# Patient Record
Sex: MALE | Race: WHITE | Employment: UNEMPLOYED | URBAN - METROPOLITAN AREA
[De-identification: names, ages, dates, MRNs, and addresses within clinical notes are randomized per-mention and may not be internally consistent; named-entity substitution may affect disease eponyms.]

---

## 2023-11-27 ENCOUNTER — APPOINTMENT (OUTPATIENT)
Dept: GENERAL RADIOLOGY | Age: 58
End: 2023-11-27
Payer: MEDICAID

## 2023-11-27 ENCOUNTER — HOSPITAL ENCOUNTER (EMERGENCY)
Age: 58
Discharge: HOME | End: 2023-11-27
Attending: EMERGENCY MEDICINE
Payer: MEDICAID

## 2023-11-27 VITALS
TEMPERATURE: 97.7 F | HEART RATE: 81 BPM | HEIGHT: 74 IN | WEIGHT: 237.8 LBS | SYSTOLIC BLOOD PRESSURE: 156 MMHG | DIASTOLIC BLOOD PRESSURE: 86 MMHG | BODY MASS INDEX: 30.52 KG/M2 | RESPIRATION RATE: 10 BRPM | OXYGEN SATURATION: 99 %

## 2023-11-27 DIAGNOSIS — S63.502A SPRAIN OF LEFT WRIST, INITIAL ENCOUNTER: Primary | ICD-10-CM

## 2023-11-27 PROCEDURE — 73110 X-RAY EXAM OF WRIST: CPT

## 2023-11-27 PROCEDURE — 99283 EMERGENCY DEPT VISIT LOW MDM: CPT

## 2023-11-27 RX ORDER — TRAMADOL HYDROCHLORIDE 50 MG/1
50 TABLET ORAL EVERY 6 HOURS PRN
Qty: 12 TABLET | Refills: 0 | Status: SHIPPED | OUTPATIENT
Start: 2023-11-27 | End: 2023-11-30

## 2023-11-27 ASSESSMENT — PAIN DESCRIPTION - LOCATION: LOCATION: WRIST

## 2023-11-27 ASSESSMENT — PAIN - FUNCTIONAL ASSESSMENT: PAIN_FUNCTIONAL_ASSESSMENT: 0-10

## 2023-11-27 ASSESSMENT — PAIN SCALES - GENERAL: PAINLEVEL_OUTOF10: 9

## 2023-11-27 ASSESSMENT — PAIN DESCRIPTION - DESCRIPTORS: DESCRIPTORS: THROBBING

## 2023-11-27 ASSESSMENT — PAIN DESCRIPTION - ORIENTATION: ORIENTATION: LEFT

## 2024-01-08 ENCOUNTER — HOSPITAL ENCOUNTER (EMERGENCY)
Age: 59
Discharge: HOME | End: 2024-01-08
Attending: EMERGENCY MEDICINE
Payer: MEDICAID

## 2024-01-08 ENCOUNTER — APPOINTMENT (OUTPATIENT)
Dept: GENERAL RADIOLOGY | Age: 59
End: 2024-01-08
Payer: MEDICAID

## 2024-01-08 VITALS
OXYGEN SATURATION: 100 % | HEIGHT: 73 IN | BODY MASS INDEX: 30.5 KG/M2 | DIASTOLIC BLOOD PRESSURE: 96 MMHG | HEART RATE: 74 BPM | RESPIRATION RATE: 17 BRPM | SYSTOLIC BLOOD PRESSURE: 139 MMHG | TEMPERATURE: 98.3 F | WEIGHT: 230.1 LBS

## 2024-01-08 DIAGNOSIS — J20.9 ACUTE BRONCHITIS, UNSPECIFIED ORGANISM: ICD-10-CM

## 2024-01-08 DIAGNOSIS — J10.1 INFLUENZA A: Primary | ICD-10-CM

## 2024-01-08 LAB
FLUAV RNA UPPER RESP QL NAA+PROBE: POSITIVE
FLUBV AG NPH QL: NEGATIVE
SARS-COV-2 RDRP RESP QL NAA+PROBE: NOT DETECTED

## 2024-01-08 PROCEDURE — 94664 DEMO&/EVAL PT USE INHALER: CPT

## 2024-01-08 PROCEDURE — 6370000000 HC RX 637 (ALT 250 FOR IP): Performed by: EMERGENCY MEDICINE

## 2024-01-08 PROCEDURE — 94760 N-INVAS EAR/PLS OXIMETRY 1: CPT

## 2024-01-08 PROCEDURE — 99284 EMERGENCY DEPT VISIT MOD MDM: CPT

## 2024-01-08 PROCEDURE — 94640 AIRWAY INHALATION TREATMENT: CPT

## 2024-01-08 PROCEDURE — 87804 INFLUENZA ASSAY W/OPTIC: CPT

## 2024-01-08 PROCEDURE — 87635 SARS-COV-2 COVID-19 AMP PRB: CPT

## 2024-01-08 PROCEDURE — 71045 X-RAY EXAM CHEST 1 VIEW: CPT

## 2024-01-08 RX ORDER — IPRATROPIUM BROMIDE AND ALBUTEROL SULFATE 2.5; .5 MG/3ML; MG/3ML
1 SOLUTION RESPIRATORY (INHALATION) ONCE
Status: COMPLETED | OUTPATIENT
Start: 2024-01-08 | End: 2024-01-08

## 2024-01-08 RX ORDER — ALBUTEROL SULFATE 90 UG/1
2 AEROSOL, METERED RESPIRATORY (INHALATION) EVERY 6 HOURS PRN
Qty: 18 G | Refills: 3 | Status: SHIPPED | OUTPATIENT
Start: 2024-01-08

## 2024-01-08 RX ORDER — PREDNISONE 10 MG/1
50 TABLET ORAL DAILY
Qty: 25 TABLET | Refills: 0 | Status: SHIPPED | OUTPATIENT
Start: 2024-01-08 | End: 2024-01-13

## 2024-01-08 RX ORDER — DEXTROMETHORPHAN HYDROBROMIDE AND PROMETHAZINE HYDROCHLORIDE 15; 6.25 MG/5ML; MG/5ML
5 SYRUP ORAL 4 TIMES DAILY PRN
Qty: 100 ML | Refills: 0 | Status: SHIPPED | OUTPATIENT
Start: 2024-01-08 | End: 2024-01-15

## 2024-01-08 RX ORDER — OSELTAMIVIR PHOSPHATE 75 MG/1
75 CAPSULE ORAL 2 TIMES DAILY
Qty: 10 CAPSULE | Refills: 0 | Status: SHIPPED | OUTPATIENT
Start: 2024-01-08 | End: 2024-01-13

## 2024-01-08 RX ADMIN — IPRATROPIUM BROMIDE AND ALBUTEROL SULFATE 1 DOSE: 2.5; .5 SOLUTION RESPIRATORY (INHALATION) at 15:35

## 2024-01-08 RX ADMIN — PREDNISONE 50 MG: 20 TABLET ORAL at 15:58

## 2024-01-08 ASSESSMENT — PAIN - FUNCTIONAL ASSESSMENT: PAIN_FUNCTIONAL_ASSESSMENT: NONE - DENIES PAIN

## 2024-01-08 NOTE — ED PROVIDER NOTES
[facility] Emergency Department - [facility]    I, Marvel Pruett MD, am the primary clinician of record.    CHIEF COMPLAINT  Chief Complaint   Patient presents with    Shortness of Breath     Pt states he had rt middle lobectomy approx 7-9 years ago, gets SOB with illness. Has tried OTC medications without relief.         HISTORY OF PRESENT ILLNESS  Juan Cantor is a 58 y.o. male  who presents to the ED complaining of cough and cold sx since Thursday.  Symptoms have been progressive since onset.  No measured fevers definitively.  He is a short of breath.  This is worse with coughing paroxysms.  No chest pains and no abd pain or vomiting/diarrhea.  Here with significant other who has similar sx.    He has a history of remote lung CA with RML resection that is in remission and did not require chemo or radiation.  He no longer smokes.  Has asthma but not COPD.    No other complaints, modifying factors or associated symptoms.     I have reviewed the following from the nursing documentation.    History reviewed. No pertinent past medical history.  History reviewed. No pertinent surgical history.  History reviewed. No pertinent family history.  Social History     Socioeconomic History    Marital status: Single     Spouse name: Not on file    Number of children: Not on file    Years of education: Not on file    Highest education level: Not on file   Occupational History    Not on file   Tobacco Use    Smoking status: Unknown    Smokeless tobacco: Not on file   Vaping Use    Vaping Use: Never used   Substance and Sexual Activity    Alcohol use: Not on file    Drug use: Not on file    Sexual activity: Not on file   Other Topics Concern    Not on file   Social History Narrative    Not on file     Social Determinants of Health     Financial Resource Strain: Not on file   Food Insecurity: Not on file   Transportation Needs: Not on file   Physical Activity: Not on file   Stress: Not on file   Social

## 2024-01-31 ENCOUNTER — HOSPITAL ENCOUNTER (EMERGENCY)
Age: 59
Discharge: HOME | End: 2024-01-31
Attending: EMERGENCY MEDICINE
Payer: MEDICAID

## 2024-01-31 VITALS
TEMPERATURE: 97.7 F | WEIGHT: 223 LBS | DIASTOLIC BLOOD PRESSURE: 81 MMHG | HEART RATE: 83 BPM | OXYGEN SATURATION: 100 % | SYSTOLIC BLOOD PRESSURE: 135 MMHG | RESPIRATION RATE: 16 BRPM | BODY MASS INDEX: 29.42 KG/M2

## 2024-01-31 DIAGNOSIS — H10.33 ACUTE CONJUNCTIVITIS OF BOTH EYES, UNSPECIFIED ACUTE CONJUNCTIVITIS TYPE: Primary | ICD-10-CM

## 2024-01-31 PROCEDURE — 99283 EMERGENCY DEPT VISIT LOW MDM: CPT

## 2024-01-31 RX ORDER — POLYETHYLENE GLYCOL 400 AND PROPYLENE GLYCOL 4; 3 MG/ML; MG/ML
2 SOLUTION/ DROPS OPHTHALMIC 3 TIMES DAILY
Qty: 10 ML | Refills: 0 | Status: SHIPPED | OUTPATIENT
Start: 2024-01-31 | End: 2024-02-05

## 2024-01-31 RX ORDER — TETRACAINE HYDROCHLORIDE 5 MG/ML
1 SOLUTION OPHTHALMIC ONCE
Status: DISCONTINUED | OUTPATIENT
Start: 2024-01-31 | End: 2024-01-31 | Stop reason: HOSPADM

## 2024-01-31 ASSESSMENT — VISUAL ACUITY
OU: 20/30
OS: 20/30
OD: 20/40

## 2024-01-31 ASSESSMENT — PAIN DESCRIPTION - PAIN TYPE: TYPE: ACUTE PAIN

## 2024-01-31 ASSESSMENT — PAIN DESCRIPTION - LOCATION: LOCATION: EYE

## 2024-01-31 ASSESSMENT — PAIN SCALES - GENERAL: PAINLEVEL_OUTOF10: 6

## 2024-01-31 ASSESSMENT — PAIN DESCRIPTION - FREQUENCY: FREQUENCY: CONTINUOUS

## 2024-01-31 ASSESSMENT — PAIN - FUNCTIONAL ASSESSMENT
PAIN_FUNCTIONAL_ASSESSMENT: NONE - DENIES PAIN
PAIN_FUNCTIONAL_ASSESSMENT: 0-10

## 2024-01-31 NOTE — DISCHARGE INSTR - COC
{Prognosis:5794793566}    Condition at Discharge: { Patient Condition:884672185}    Rehab Potential (if transferring to Rehab): {Prognosis:0159554313}    Recommended Labs or Other Treatments After Discharge: ***    Physician Certification: I certify the above information and transfer of Juan Cantor  is necessary for the continuing treatment of the diagnosis listed and that he requires {Admit to Appropriate Level of Care:23531} for {GREATER/LESS:390023792} 30 days.     Update Admission H&P: {CHP DME Changes in HandP:168529997}    PHYSICIAN SIGNATURE:  {Esignature:[id number]}

## 2024-01-31 NOTE — ED PROVIDER NOTES
ophthalmic solution 1 drop  1 drop Both Eyes Once Robert Rm MD         Current Outpatient Medications   Medication Sig Dispense Refill    polyethyl glycol-propyl glycol 0.4-0.3 % (SYSTANE) 0.4-0.3 % ophthalmic solution Place 2 drops into both eyes in the morning, at noon, and at bedtime for 5 days 10 mL 0    albuterol (PROVENTIL) (5 MG/ML) 0.5% nebulizer solution Take 1 mL by nebulization 4 times daily as needed for Wheezing 120 each 3    albuterol sulfate HFA (PROAIR HFA) 108 (90 Base) MCG/ACT inhaler Inhale 2 puffs into the lungs every 6 hours as needed for Wheezing 18 g 3     Allergies   Allergen Reactions    Ibuprofen      Kidney damage          PHYSICAL EXAM  ED Triage Vitals [01/31/24 0652]   BP Temp Temp Source Pulse Respirations SpO2 Height Weight - Scale   135/81 97.7 °F (36.5 °C) Oral 83 16 100 % -- 101.2 kg (223 lb)     General appearance: Awake and alert. Cooperative. No acute distress.  HEENT: Normocephalic. Atraumatic. Mucous membranes are moist. Bilateral conjunctival injection. No hyphema, hypopyon, or proptosis. No fluorescein uptake. No foreign bodies noted, included with bilateral lid eversion.   Skin: Warm and dry. No acute rashes.       ED COURSE/MDM    58 y.o. male presents with bilateral eye irritation. Likely a non-infectious conjunctivitis secondary to exposure to construction debris. No corneal abrasion or FB noted on exam. Patient has already performed copious eye irrigation. Visual acuities are reassuring. Will prescribe Systane eye drops. F/u with [facility] as needed. Usual strict return precautions for new or worsening symptoms communicated.      - History obtained from: patient     I, Robert Rm MD, am the primary clinician of record.     During the patient's ED course, the patient was given:  Medications   fluorescein ophthalmic strip 1 mg (has no administration in time range)   tetracaine (TETRAVISC) 0.5 % ophthalmic solution 1 drop (has no administration in time range)

## 2024-02-15 ENCOUNTER — APPOINTMENT (OUTPATIENT)
Dept: CT IMAGING | Age: 59
DRG: 030 | End: 2024-02-15
Payer: MEDICAID

## 2024-02-15 ENCOUNTER — HOSPITAL ENCOUNTER (INPATIENT)
Age: 59
LOS: 7 days | Discharge: HOME | DRG: 030 | End: 2024-02-22
Attending: EMERGENCY MEDICINE | Admitting: STUDENT IN AN ORGANIZED HEALTH CARE EDUCATION/TRAINING PROGRAM
Payer: MEDICAID

## 2024-02-15 ENCOUNTER — APPOINTMENT (OUTPATIENT)
Dept: MRI IMAGING | Age: 59
DRG: 030 | End: 2024-02-15
Payer: MEDICAID

## 2024-02-15 DIAGNOSIS — I65.23 CAROTID STENOSIS, BILATERAL: ICD-10-CM

## 2024-02-15 DIAGNOSIS — G45.9 TIA (TRANSIENT ISCHEMIC ATTACK): Primary | ICD-10-CM

## 2024-02-15 PROBLEM — R47.01 APHASIA: Status: ACTIVE | Noted: 2024-02-15

## 2024-02-15 PROBLEM — I65.21 RIGHT CAROTID ARTERY OCCLUSION: Status: ACTIVE | Noted: 2024-02-15

## 2024-02-15 LAB
ANION GAP SERPL CALCULATED.3IONS-SCNC: 9 MMOL/L (ref 3–16)
ANTI-XA UNFRAC HEPARIN: 0.12 IU/ML (ref 0.3–0.7)
ANTI-XA UNFRAC HEPARIN: 0.12 IU/ML (ref 0.3–0.7)
ANTI-XA UNFRAC HEPARIN: <0.1 IU/ML (ref 0.3–0.7)
APTT BLD: 29.6 SEC (ref 22.7–35.9)
BASOPHILS # BLD: 0.1 K/UL (ref 0–0.2)
BASOPHILS NFR BLD: 1.2 %
BUN SERPL-MCNC: 23 MG/DL (ref 7–20)
CALCIUM SERPL-MCNC: 9 MG/DL (ref 8.3–10.6)
CHLORIDE SERPL-SCNC: 105 MMOL/L (ref 99–110)
CHOLEST SERPL-MCNC: 258 MG/DL (ref 0–199)
CO2 SERPL-SCNC: 23 MMOL/L (ref 21–32)
CREAT SERPL-MCNC: 1.4 MG/DL (ref 0.9–1.3)
DEPRECATED RDW RBC AUTO: 14.9 % (ref 12.4–15.4)
EOSINOPHIL # BLD: 0.1 K/UL (ref 0–0.6)
EOSINOPHIL NFR BLD: 1.2 %
GFR SERPLBLD CREATININE-BSD FMLA CKD-EPI: 58 ML/MIN/{1.73_M2}
GLUCOSE BLD-MCNC: 122 MG/DL (ref 70–99)
GLUCOSE SERPL-MCNC: 115 MG/DL (ref 70–99)
HCT VFR BLD AUTO: 42.9 % (ref 40.5–52.5)
HDLC SERPL-MCNC: 34 MG/DL (ref 40–60)
HGB BLD-MCNC: 14 G/DL (ref 13.5–17.5)
INR PPP: 0.92 (ref 0.84–1.16)
LDLC SERPL CALC-MCNC: 186 MG/DL
LYMPHOCYTES # BLD: 1.9 K/UL (ref 1–5.1)
LYMPHOCYTES NFR BLD: 24.3 %
MCH RBC QN AUTO: 27 PG (ref 26–34)
MCHC RBC AUTO-ENTMCNC: 32.7 G/DL (ref 31–36)
MCV RBC AUTO: 82.7 FL (ref 80–100)
MONOCYTES # BLD: 0.4 K/UL (ref 0–1.3)
MONOCYTES NFR BLD: 5.6 %
NEUTROPHILS # BLD: 5.2 K/UL (ref 1.7–7.7)
NEUTROPHILS NFR BLD: 67.7 %
PERFORMED ON: ABNORMAL
PLATELET # BLD AUTO: 180 K/UL (ref 135–450)
PMV BLD AUTO: 9.3 FL (ref 5–10.5)
POTASSIUM SERPL-SCNC: 4 MMOL/L (ref 3.5–5.1)
PROTHROMBIN TIME: 12.4 SEC (ref 11.5–14.8)
RBC # BLD AUTO: 5.19 M/UL (ref 4.2–5.9)
SODIUM SERPL-SCNC: 137 MMOL/L (ref 136–145)
TRIGL SERPL-MCNC: 189 MG/DL (ref 0–150)
TROPONIN, HIGH SENSITIVITY: 8 NG/L (ref 0–22)
VLDLC SERPL CALC-MCNC: 38 MG/DL
WBC # BLD AUTO: 7.7 K/UL (ref 4–11)

## 2024-02-15 PROCEDURE — 85730 THROMBOPLASTIN TIME PARTIAL: CPT

## 2024-02-15 PROCEDURE — 2580000003 HC RX 258

## 2024-02-15 PROCEDURE — 85610 PROTHROMBIN TIME: CPT

## 2024-02-15 PROCEDURE — 84484 ASSAY OF TROPONIN QUANT: CPT

## 2024-02-15 PROCEDURE — 70496 CT ANGIOGRAPHY HEAD: CPT

## 2024-02-15 PROCEDURE — 85520 HEPARIN ASSAY: CPT

## 2024-02-15 PROCEDURE — 85025 COMPLETE CBC W/AUTO DIFF WBC: CPT

## 2024-02-15 PROCEDURE — 80061 LIPID PANEL: CPT

## 2024-02-15 PROCEDURE — 6360000002 HC RX W HCPCS: Performed by: EMERGENCY MEDICINE

## 2024-02-15 PROCEDURE — 80048 BASIC METABOLIC PNL TOTAL CA: CPT

## 2024-02-15 PROCEDURE — 6360000004 HC RX CONTRAST MEDICATION: Performed by: EMERGENCY MEDICINE

## 2024-02-15 PROCEDURE — 99285 EMERGENCY DEPT VISIT HI MDM: CPT

## 2024-02-15 PROCEDURE — 70450 CT HEAD/BRAIN W/O DYE: CPT

## 2024-02-15 PROCEDURE — APPNB45 APP NON BILLABLE 31-45 MINUTES: Performed by: NURSE PRACTITIONER

## 2024-02-15 PROCEDURE — 2000000000 HC ICU R&B

## 2024-02-15 PROCEDURE — 6360000002 HC RX W HCPCS: Performed by: STUDENT IN AN ORGANIZED HEALTH CARE EDUCATION/TRAINING PROGRAM

## 2024-02-15 PROCEDURE — 36415 COLL VENOUS BLD VENIPUNCTURE: CPT

## 2024-02-15 PROCEDURE — 93005 ELECTROCARDIOGRAM TRACING: CPT | Performed by: EMERGENCY MEDICINE

## 2024-02-15 PROCEDURE — 99255 IP/OBS CONSLTJ NEW/EST HI 80: CPT | Performed by: NURSE PRACTITIONER

## 2024-02-15 RX ORDER — HEPARIN SODIUM 1000 [USP'U]/ML
40 INJECTION, SOLUTION INTRAVENOUS; SUBCUTANEOUS PRN
Status: DISCONTINUED | OUTPATIENT
Start: 2024-02-15 | End: 2024-02-15

## 2024-02-15 RX ORDER — HEPARIN SODIUM 10000 [USP'U]/100ML
5-30 INJECTION, SOLUTION INTRAVENOUS CONTINUOUS
Status: DISCONTINUED | OUTPATIENT
Start: 2024-02-15 | End: 2024-02-20

## 2024-02-15 RX ORDER — SODIUM CHLORIDE 9 MG/ML
INJECTION, SOLUTION INTRAVENOUS PRN
Status: DISCONTINUED | OUTPATIENT
Start: 2024-02-15 | End: 2024-02-22 | Stop reason: HOSPADM

## 2024-02-15 RX ORDER — SODIUM CHLORIDE 0.9 % (FLUSH) 0.9 %
5-40 SYRINGE (ML) INJECTION EVERY 12 HOURS SCHEDULED
Status: DISCONTINUED | OUTPATIENT
Start: 2024-02-15 | End: 2024-02-22 | Stop reason: HOSPADM

## 2024-02-15 RX ORDER — ACETAMINOPHEN 325 MG/1
650 TABLET ORAL EVERY 6 HOURS PRN
Status: DISCONTINUED | OUTPATIENT
Start: 2024-02-15 | End: 2024-02-21 | Stop reason: DRUGHIGH

## 2024-02-15 RX ORDER — ROSUVASTATIN CALCIUM 20 MG/1
40 TABLET, COATED ORAL NIGHTLY
Status: DISCONTINUED | OUTPATIENT
Start: 2024-02-15 | End: 2024-02-22 | Stop reason: HOSPADM

## 2024-02-15 RX ORDER — POLYETHYLENE GLYCOL 3350 17 G/17G
17 POWDER, FOR SOLUTION ORAL DAILY PRN
Status: DISCONTINUED | OUTPATIENT
Start: 2024-02-15 | End: 2024-02-22 | Stop reason: HOSPADM

## 2024-02-15 RX ORDER — ATORVASTATIN CALCIUM 80 MG/1
80 TABLET, FILM COATED ORAL NIGHTLY
Status: DISCONTINUED | OUTPATIENT
Start: 2024-02-15 | End: 2024-02-15

## 2024-02-15 RX ORDER — LOSARTAN POTASSIUM AND HYDROCHLOROTHIAZIDE 12.5; 1 MG/1; MG/1
1 TABLET ORAL DAILY
COMMUNITY

## 2024-02-15 RX ORDER — SODIUM CHLORIDE 0.9 % (FLUSH) 0.9 %
5-40 SYRINGE (ML) INJECTION PRN
Status: DISCONTINUED | OUTPATIENT
Start: 2024-02-15 | End: 2024-02-22 | Stop reason: HOSPADM

## 2024-02-15 RX ORDER — HEPARIN SODIUM 1000 [USP'U]/ML
80 INJECTION, SOLUTION INTRAVENOUS; SUBCUTANEOUS PRN
Status: DISCONTINUED | OUTPATIENT
Start: 2024-02-15 | End: 2024-02-15

## 2024-02-15 RX ORDER — HEPARIN SODIUM 10000 [USP'U]/100ML
5-30 INJECTION, SOLUTION INTRAVENOUS CONTINUOUS
Status: DISCONTINUED | OUTPATIENT
Start: 2024-02-15 | End: 2024-02-15 | Stop reason: SDUPTHER

## 2024-02-15 RX ORDER — OMEPRAZOLE 20 MG/1
20 CAPSULE, DELAYED RELEASE ORAL DAILY
COMMUNITY

## 2024-02-15 RX ORDER — ONDANSETRON 4 MG/1
4 TABLET, ORALLY DISINTEGRATING ORAL EVERY 8 HOURS PRN
Status: DISCONTINUED | OUTPATIENT
Start: 2024-02-15 | End: 2024-02-22 | Stop reason: HOSPADM

## 2024-02-15 RX ORDER — ACETAMINOPHEN 650 MG/1
650 SUPPOSITORY RECTAL EVERY 6 HOURS PRN
Status: DISCONTINUED | OUTPATIENT
Start: 2024-02-15 | End: 2024-02-22 | Stop reason: HOSPADM

## 2024-02-15 RX ORDER — AMLODIPINE BESYLATE 10 MG/1
10 TABLET ORAL DAILY
COMMUNITY

## 2024-02-15 RX ORDER — ONDANSETRON 2 MG/ML
4 INJECTION INTRAMUSCULAR; INTRAVENOUS EVERY 6 HOURS PRN
Status: DISCONTINUED | OUTPATIENT
Start: 2024-02-15 | End: 2024-02-22 | Stop reason: HOSPADM

## 2024-02-15 RX ORDER — HEPARIN SODIUM 1000 [USP'U]/ML
80 INJECTION, SOLUTION INTRAVENOUS; SUBCUTANEOUS ONCE
Status: DISCONTINUED | OUTPATIENT
Start: 2024-02-15 | End: 2024-02-15

## 2024-02-15 RX ADMIN — HEPARIN SODIUM 18 UNITS/KG/HR: 10000 INJECTION, SOLUTION INTRAVENOUS at 09:12

## 2024-02-15 RX ADMIN — SODIUM CHLORIDE, PRESERVATIVE FREE 5 ML: 5 INJECTION INTRAVENOUS at 21:20

## 2024-02-15 RX ADMIN — IOPAMIDOL 75 ML: 755 INJECTION, SOLUTION INTRAVENOUS at 08:14

## 2024-02-15 RX ADMIN — SODIUM CHLORIDE, PRESERVATIVE FREE 5 ML: 5 INJECTION INTRAVENOUS at 12:18

## 2024-02-15 RX ADMIN — HEPARIN SODIUM 9 UNITS/KG/HR: 10000 INJECTION, SOLUTION INTRAVENOUS at 09:44

## 2024-02-15 NOTE — H&P
amlodipine 10 mg at [facility] meds held, NPO    Hx lung cancer  S/p R middle lobectomy 2018, has elevated AYAH, RF d/t this, has seen Rheum in past.    CKD(?)  Patient with persistently elevated creatinines, has CKD documented in chart, no note from nephrologist in chart    Code Status: No Order   PPX: heparin gtt  DISPO: ICU    Jennifer Scherer MD, PGY-1  Internal Medicine Resident  Contact via [employer]  02/15/24  9:10 AM    This patient has been staffed and discussed with Dr. Danika Gregorio.

## 2024-02-15 NOTE — DISCHARGE INSTRUCTIONS
[facility] Stroke Program Survey  The [facility]science [place] values your feedback related to your recent hospital visit and admission. We strive to improve our Neuroscience program to promote better outcomes and recoveries for all our patients.  The anonymous survey below consists of a few questions that are related to your stay and around your Stroke diagnosis, treatment, and recovery. It is anonymous and has only a few questions.  The estimated length of time needed to complete this survey is 3 minutes or less. Thank you for completing this survey!

## 2024-02-15 NOTE — NURSE NAVIGATOR
CTA with R ICA occlusion and high-grade stenosis of L ICA (80-90%)     Patients personal risk factors specific to stroke/TIA include: dyslipidemia, hypertension, lung cancer (s/p resection R middle lobectomy in 2018), overweight    Patient's chart reviewed for Stroke Core Measures and additional needs:    [x]   VTE prophylaxis - SCDs ordered; Heparin gtt infusing, see eMAR    [x]   Antithrombotic (if applicable) - Heparin gtt infusing, see eMAR    []   Swallow screen prior to PO intake - Bedside RN to perform swallow screen prior to any PO intake of food/medications    [x]   Lipids / A1C ordered or resulted   []   Therapy ordered - Discussed need with NCC, will order when appropriate    [x]   Care plan and Education template - Added     - MRI brain ordered     Navigator to continue to follow patient while admitted, to assist with follow up and discharge planning as needed.     Nurse eSignature: Electronically signed by Josefina Gutierrez RN on 2/15/24 at 1:37 PM EST - Neuroscience Navigator

## 2024-02-15 NOTE — ED PROVIDER NOTES
Emergency Department Encounter    Patient: Juan Cantor  MRN: [id number]  : 1965  Date of Evaluation: 2024  ED Provider:  ALBIN DO MD    Triage Chief Complaint:   feels jittery and shaky    [place]:  Juan Cantor is a 58 y.o. male that presents to the ER for evaluation of positive expressive aphasia, no receptive aphasia, possible underlying dysarthria he started with weakness of his left hand and ataxia and weakness of the left leg.  Afebrile.  No headache.  History of hypertension.  Occasional alcohol use occasional marijuana no cocaine.  No methamphetamines.  Onset was at 07 30.  Improved speech, sensation of mild residual paresthesia    ROS - see HPI, below listed is current ROS at time of my eval:  General:  No fevers, no chills, no weakness  Eyes:  No recent vison changes, no discharge  ENT:  No sore throat, no nasal congestion, no hearing changes  Cardiovascular:  No chest pain  Respiratory:  No shortness of breath  Gastrointestinal:  No pain, no nausea, no vomiting, no diarrhea  Musculoskeletal:  No muscle pain, no joint pain  Skin:  No rash, no pruritis, no easy bruising  Neurologic:  No speech problems,no  headache, no extremity numbness, no extremity tingling, no extremity weakness, no neck pain or stiffness  Psychiatric:  No anxiety  Extremities:  no edema, no pain    No past medical history on file.  No past surgical history on file.  No family history on file.  Social History     Socioeconomic History    Marital status: Single     Spouse name: Not on file    Number of children: Not on file    Years of education: Not on file    Highest education level: Not on file   Occupational History    Not on file   Tobacco Use    Smoking status: Some Days    Smokeless tobacco: Never   Vaping Use    Vaping Use: Never used   Substance and Sexual Activity    Alcohol use: Yes     Comment: social    Drug use: Yes     Types: Marijuana (Weed)    Sexual activity: Not on file   Other Topics

## 2024-02-15 NOTE — PLAN OF CARE
Problem: Discharge Planning  Goal: Discharge to home or other facility with appropriate resources  Outcome: Progressing  Flowsheets (Taken 2/15/2024 1200)  Discharge to home or other facility with appropriate resources:   Identify barriers to discharge with patient and caregiver   Identify discharge learning needs (meds, wound care, etc)     Problem: Neurosensory - Adult  Goal: Achieves stable or improved neurological status  Outcome: Progressing  Goal: Achieves maximal functionality and self care  Outcome: Progressing     Problem: Safety - Adult  Goal: Free from fall injury  Outcome: Progressing     Problem: ABCDS Injury Assessment  Goal: Absence of physical injury  Outcome: Progressing

## 2024-02-16 ENCOUNTER — APPOINTMENT (OUTPATIENT)
Dept: MRI IMAGING | Age: 59
DRG: 030 | End: 2024-02-16
Payer: MEDICAID

## 2024-02-16 ENCOUNTER — APPOINTMENT (OUTPATIENT)
Dept: CT IMAGING | Age: 59
DRG: 030 | End: 2024-02-16
Payer: MEDICAID

## 2024-02-16 PROBLEM — N17.9 AKI (ACUTE KIDNEY INJURY) (HCC): Status: ACTIVE | Noted: 2024-02-16

## 2024-02-16 PROBLEM — I65.22 LEFT CAROTID ARTERY STENOSIS: Status: ACTIVE | Noted: 2024-02-15

## 2024-02-16 PROBLEM — I10 HYPERTENSION: Status: ACTIVE | Noted: 2024-02-16

## 2024-02-16 LAB
ANION GAP SERPL CALCULATED.3IONS-SCNC: 12 MMOL/L (ref 3–16)
ANTI-XA UNFRAC HEPARIN: 0.19 IU/ML (ref 0.3–0.7)
ANTI-XA UNFRAC HEPARIN: 0.28 IU/ML (ref 0.3–0.7)
ANTI-XA UNFRAC HEPARIN: 0.37 IU/ML (ref 0.3–0.7)
ANTI-XA UNFRAC HEPARIN: 0.46 IU/ML (ref 0.3–0.7)
BASOPHILS # BLD: 0.1 K/UL (ref 0–0.2)
BASOPHILS NFR BLD: 0.9 %
BUN SERPL-MCNC: 22 MG/DL (ref 7–20)
CALCIUM SERPL-MCNC: 8.9 MG/DL (ref 8.3–10.6)
CHLORIDE SERPL-SCNC: 103 MMOL/L (ref 99–110)
CO2 SERPL-SCNC: 23 MMOL/L (ref 21–32)
CREAT SERPL-MCNC: 1.3 MG/DL (ref 0.9–1.3)
CREAT UR-MCNC: 156 MG/DL (ref 39–259)
DEPRECATED RDW RBC AUTO: 14.9 % (ref 12.4–15.4)
EKG ATRIAL RATE: 73 BPM
EKG DIAGNOSIS: NORMAL
EKG P AXIS: 58 DEGREES
EKG P-R INTERVAL: 170 MS
EKG Q-T INTERVAL: 394 MS
EKG QRS DURATION: 104 MS
EKG QTC CALCULATION (BAZETT): 434 MS
EKG R AXIS: 57 DEGREES
EKG T AXIS: -23 DEGREES
EKG VENTRICULAR RATE: 73 BPM
EOSINOPHIL # BLD: 0.1 K/UL (ref 0–0.6)
EOSINOPHIL NFR BLD: 1.2 %
GFR SERPLBLD CREATININE-BSD FMLA CKD-EPI: >60 ML/MIN/{1.73_M2}
GLUCOSE SERPL-MCNC: 110 MG/DL (ref 70–99)
HCT VFR BLD AUTO: 46.2 % (ref 40.5–52.5)
HGB BLD-MCNC: 14.8 G/DL (ref 13.5–17.5)
LYMPHOCYTES # BLD: 2.5 K/UL (ref 1–5.1)
LYMPHOCYTES NFR BLD: 30.1 %
MAGNESIUM SERPL-MCNC: 2.2 MG/DL (ref 1.8–2.4)
MCH RBC QN AUTO: 27.1 PG (ref 26–34)
MCHC RBC AUTO-ENTMCNC: 32 G/DL (ref 31–36)
MCV RBC AUTO: 84.6 FL (ref 80–100)
MONOCYTES # BLD: 0.5 K/UL (ref 0–1.3)
MONOCYTES NFR BLD: 6.2 %
NEUTROPHILS # BLD: 5.2 K/UL (ref 1.7–7.7)
NEUTROPHILS NFR BLD: 61.6 %
PLATELET # BLD AUTO: 193 K/UL (ref 135–450)
PMV BLD AUTO: 10.4 FL (ref 5–10.5)
POTASSIUM SERPL-SCNC: 4.1 MMOL/L (ref 3.5–5.1)
PROT UR-MCNC: 10 MG/DL
PROT/CREAT UR-RTO: 0.1 MG/DL
RBC # BLD AUTO: 5.46 M/UL (ref 4.2–5.9)
SODIUM SERPL-SCNC: 138 MMOL/L (ref 136–145)
WBC # BLD AUTO: 8.5 K/UL (ref 4–11)

## 2024-02-16 PROCEDURE — 83036 HEMOGLOBIN GLYCOSYLATED A1C: CPT

## 2024-02-16 PROCEDURE — 80048 BASIC METABOLIC PNL TOTAL CA: CPT

## 2024-02-16 PROCEDURE — 99231 SBSQ HOSP IP/OBS SF/LOW 25: CPT | Performed by: NURSE PRACTITIONER

## 2024-02-16 PROCEDURE — 70551 MRI BRAIN STEM W/O DYE: CPT

## 2024-02-16 PROCEDURE — 85025 COMPLETE CBC W/AUTO DIFF WBC: CPT

## 2024-02-16 PROCEDURE — 84156 ASSAY OF PROTEIN URINE: CPT

## 2024-02-16 PROCEDURE — 2580000003 HC RX 258

## 2024-02-16 PROCEDURE — 6370000000 HC RX 637 (ALT 250 FOR IP)

## 2024-02-16 PROCEDURE — 6360000002 HC RX W HCPCS: Performed by: STUDENT IN AN ORGANIZED HEALTH CARE EDUCATION/TRAINING PROGRAM

## 2024-02-16 PROCEDURE — 93010 ELECTROCARDIOGRAM REPORT: CPT | Performed by: INTERNAL MEDICINE

## 2024-02-16 PROCEDURE — 97535 SELF CARE MNGMENT TRAINING: CPT

## 2024-02-16 PROCEDURE — 2000000000 HC ICU R&B

## 2024-02-16 PROCEDURE — C8929 TTE W OR WO FOL WCON,DOPPLER: HCPCS

## 2024-02-16 PROCEDURE — 99232 SBSQ HOSP IP/OBS MODERATE 35: CPT | Performed by: NURSE PRACTITIONER

## 2024-02-16 PROCEDURE — 36415 COLL VENOUS BLD VENIPUNCTURE: CPT

## 2024-02-16 PROCEDURE — 99222 1ST HOSP IP/OBS MODERATE 55: CPT | Performed by: INTERNAL MEDICINE

## 2024-02-16 PROCEDURE — 97530 THERAPEUTIC ACTIVITIES: CPT

## 2024-02-16 PROCEDURE — 97161 PT EVAL LOW COMPLEX 20 MIN: CPT

## 2024-02-16 PROCEDURE — 82570 ASSAY OF URINE CREATININE: CPT

## 2024-02-16 PROCEDURE — 70450 CT HEAD/BRAIN W/O DYE: CPT

## 2024-02-16 PROCEDURE — 97116 GAIT TRAINING THERAPY: CPT

## 2024-02-16 PROCEDURE — 85520 HEPARIN ASSAY: CPT

## 2024-02-16 PROCEDURE — 97165 OT EVAL LOW COMPLEX 30 MIN: CPT

## 2024-02-16 PROCEDURE — 83735 ASSAY OF MAGNESIUM: CPT

## 2024-02-16 RX ORDER — SODIUM CHLORIDE 9 MG/ML
INJECTION, SOLUTION INTRAVENOUS CONTINUOUS
Status: DISCONTINUED | OUTPATIENT
Start: 2024-02-16 | End: 2024-02-19

## 2024-02-16 RX ADMIN — SODIUM CHLORIDE, PRESERVATIVE FREE 5 ML: 5 INJECTION INTRAVENOUS at 09:28

## 2024-02-16 RX ADMIN — HEPARIN SODIUM 13 UNITS/KG/HR: 10000 INJECTION, SOLUTION INTRAVENOUS at 04:37

## 2024-02-16 RX ADMIN — SODIUM CHLORIDE, PRESERVATIVE FREE 10 ML: 5 INJECTION INTRAVENOUS at 20:52

## 2024-02-16 RX ADMIN — ONDANSETRON 4 MG: 4 TABLET, ORALLY DISINTEGRATING ORAL at 00:31

## 2024-02-16 RX ADMIN — ROSUVASTATIN CALCIUM 40 MG: 20 TABLET, FILM COATED ORAL at 20:41

## 2024-02-16 RX ADMIN — ACETAMINOPHEN 650 MG: 325 TABLET ORAL at 00:32

## 2024-02-16 RX ADMIN — SODIUM CHLORIDE: 9 INJECTION, SOLUTION INTRAVENOUS at 12:56

## 2024-02-16 RX ADMIN — HEPARIN SODIUM 17 UNITS/KG/HR: 10000 INJECTION, SOLUTION INTRAVENOUS at 22:19

## 2024-02-16 ASSESSMENT — PAIN SCALES - GENERAL
PAINLEVEL_OUTOF10: 0
PAINLEVEL_OUTOF10: 6
PAINLEVEL_OUTOF10: 0

## 2024-02-16 ASSESSMENT — PAIN DESCRIPTION - DESCRIPTORS: DESCRIPTORS: PRESSURE

## 2024-02-16 ASSESSMENT — PAIN DESCRIPTION - LOCATION: LOCATION: HEAD

## 2024-02-16 ASSESSMENT — PAIN DESCRIPTION - ORIENTATION: ORIENTATION: ANTERIOR

## 2024-02-16 ASSESSMENT — PAIN - FUNCTIONAL ASSESSMENT: PAIN_FUNCTIONAL_ASSESSMENT: ACTIVITIES ARE NOT PREVENTED

## 2024-02-16 NOTE — PLAN OF CARE
Problem: Discharge Planning  Goal: Discharge to home or other facility with appropriate resources  2/16/2024 0528 by Shyanne Conway RN  Outcome: Progressing  Flowsheets (Taken 2/15/2024 2000)  Discharge to home or other facility with appropriate resources:   Identify barriers to discharge with patient and caregiver   Arrange for needed discharge resources and transportation as appropriate   Identify discharge learning needs (meds, wound care, etc)   Refer to discharge planning if patient needs post-hospital services based on physician order or complex needs related to functional status, cognitive ability or social support system    Problem: Neurosensory - Adult  Goal: Achieves stable or improved neurological status  2/16/2024 0528 by Shyanne Conway RN  Outcome: Progressing  Flowsheets (Taken 2/15/2024 2000)  Achieves stable or improved neurological status:   Assess for and report changes in neurological status   Initiate measures to prevent increased intracranial pressure   Maintain blood pressure and fluid volume within ordered parameters to optimize cerebral perfusion and minimize risk of hemorrhage   Monitor temperature, glucose, and sodium. Initiate appropriate interventions as ordered    Goal: Achieves maximal functionality and self care  2/16/2024 0528 by Shyanne Conway RN  Outcome: Progressing  Flowsheets (Taken 2/15/2024 2000)  Achieves maximal functionality and self care:   Monitor swallowing and airway patency with patient fatigue and changes in neurological status   Encourage and assist patient to increase activity and self care with guidance from physical therapy/occupational therapy   Encourage visually impaired, hearing impaired and aphasic patients to use assistive/communication devices    Problem: Safety - Adult  Goal: Free from fall injury  2/16/2024 0528 by Shyanne Conway RN  Outcome: Progressing  Flowsheets (Taken 2/15/2024 2000)  Free From Fall Injury:

## 2024-02-16 NOTE — PLAN OF CARE
Problem: Neurosensory - Adult  Goal: Achieves stable or improved neurological status  2/16/2024 1334 by Guerda Yoo RN  Outcome: Not Progressing  Achieves stable or improved neurological status:   Assess for and report changes in neurological status   Initiate measures to prevent increased intracranial pressure   Maintain blood pressure and fluid volume within ordered parameters to optimize cerebral perfusion and minimize risk of hemorrhage   Monitor temperature, glucose, and sodium. Initiate appropriate interventions as ordered     Problem: Discharge Planning  Goal: Discharge to home or other facility with appropriate resources  2/16/2024 1334 by Guerda Yoo RN  Outcome: Progressing  Discharge to home or other facility with appropriate resources:   Identify barriers to discharge with patient and caregiver   Arrange for needed discharge resources and transportation as appropriate   Identify discharge learning needs (meds, wound care, etc)   Refer to discharge planning if patient needs post-hospital services based on physician order or complex needs related to functional status, cognitive ability or social support system     Problem: Neurosensory - Adult  Goal: Achieves maximal functionality and self care  2/16/2024 1334 by Guerda Yoo RN  Outcome: Progressing  Achieves maximal functionality and self care:   Monitor swallowing and airway patency with patient fatigue and changes in neurological status   Encourage and assist patient to increase activity and self care with guidance from physical therapy/occupational therapy   Encourage visually impaired, hearing impaired and aphasic patients to use assistive/communication devices     Problem: Safety - Adult  Goal: Free from fall injury  2/16/2024 1334 by Guerda Yoo RN  Outcome: Progressing  Flowsheets (Taken 2/16/2024 0911)  Free From Fall Injury: Instruct family/caregiver on patient safety     Problem: ABCDS Injury Assessment  Goal:

## 2024-02-16 NOTE — NURSE NAVIGATOR
Verified educational Stroke booklet in room for patient and/or family to review. Patients personal risk factors specific to stroke/TIA include: dyslipidemia, hypertension, lung cancer (s/p resection R middle lobectomy in 2018), overweight. Discussed personal risk factors for Stroke/TIA with patient/family, and ways to reduce the risk for a recurrent stroke.     Advised pt. that you can reduce your risk for stroke/TIA by modifying/controlling the risk factors that you have. Pt.advised to take the medications as prescribed, which will be detailed in the discharge instructions, and to not stop taking them without consulting their physician. In addition, pt. advised to maintain a healthy diet, exercise regularly and to not smoke.    [facility]'s Stroke treatment and prevention, Managing your recovery  notebook  provided and/or reviewed  with patient/family.  The notebook includes, but not limited to, sections addressing warning signs & symptoms of a stroke, which are: sudden numbness or weakness especially on one side of the body, sudden confusion, difficulty speaking or understanding, sudden changes in vision, sudden dizziness or loss of balance/ coordination, sudden severe headache, syncope and seizure.  The need to call EMS (911) immediately if signs & symptoms occur is emphasized . The notebook also provides education on Stroke community resources and stroke advocacy.    The need for follow-up after discharge was highlighted with patient/family with them being able to repeat understanding of the importance of this.    Navigator to continue to follow patient while admitted, to assist with follow up and discharge planning as needed.     Nurse eSignature: Electronically signed by Josefina Gutierrez RN on 2/16/24 at 4:11 PM EST - Neuroscience Navigator

## 2024-02-16 NOTE — CONSULTS
NEUROCRITICAL CARE CONSULT NOTE       Patient: Juan Cantor MRN: [id number]    YOB: 1965  Age: 58 y.o.  Sex: male   Unit: [facility] ICU TOWER Room/Bed: 4502/4502-01 Location: [facility]    Date of Consultation: 2/15/2024  Date of Admission: 2/15/2024  7:50 AM ( LOS: 0 days )  Primary Care Physician: Romeo Ritchie APRN - NP   Consult Requested By: Danika Gregorio MD    Reason for Consult: Stroke    IMPRESSION & RECOMMENDATIONS     IMPRESSION:  Patient is a 57 y/o M who presents with L sided numbness and L hand weakness found to have R ICA occlussion and severe L ICA stenosis. Transferred to [facility] for heparin gtt and possible intervention on L ICA    RECOMMENDATIONS / PLAN:  R ICA Oclussion / L ICA severe stenosis   Imaging / Labs:  -Obtain MRI of the brain w/o contrast to eval for stroke  -Check lipid panel and hemoglobin A1C if not already completed     Medications:  -High-intensity statin (as able - has allergy)   -Start heparin gtt (Neuro protocol - No bolus)  -SCDs for DVT prophylaxis    Consults / Nursing Care  -HOB elevated to help prevent aspiration  -Q1H Neurologic Exams & Vitals  -NIHSS Qshift  -Telemetry   -PT/OT: eval and treat  -PMR consult if rehab recommended   -ST: eval and treat and dysphagia screen  -Nursing bedside swallow prior to any PO intake   -Groin checks per post procedure guidelines  -Blood Pressure Management   - -220  -Further plan per neurovascular  -We will follow. Please call with questions.     Management and plan discussed with:  Nursing staff  VIPUL Iraheta - CNP   NEUROCRITICAL CARE  2/15/2024 1:33 PM  [employer]: [facility] NEUROCRITICAL CARE  History of Present Illness     Juan Cantor is a 58 y.o. male with unspecified rheumatologic condition (positive AYAH 1:160, elevated RF 28), C5 fracture, HTN presenting to [facility] on 02/15/24 for acute word-finding difficulty, LEFT arm weakness, LEFT hemisensory loss.   
     ICU CONSULT       Hospital Day: 2  ICU Day: 2                                                         Code:Full Code  Admit Date: 2/15/2024  PCP: Romeo Ritchie, APRN - NP                                  CC: Weakness, aphasia    INTERVAL HISTORY:   NAEO. This AM, patient well, no symptoms.    HISTORY OF PRESENT ILLNESS:   Pt is a 57 yo male with past medical history HLD, HTN, lung cancer s/p resection R middle lobectomy in 2018, CKD(?) presenting with aphasia and weakness. Patient was at home and dropped a pencil on the ground and then found that he was able to initially grab it but then not keep it in his grasp and then found he was unable to speak fluently as well, partner reports he was garbling his speech. Patient then presented to [facility] ED d/t these symptoms.      At [facility] patient had improved speech initially though he did have a repeat episode of garbled speech for a short amount of time on arrival to [facility], CTA revealed complete R carotid occlusion and severe stenosis of L carotid. Patient then transferred to [facility] ICU for further monitoring.      On arrival to [facility], aphasia improved, still experiencing limited L sided numbness but strength much better. Will continue to monitor.      MEDICATIONS:     No current facility-administered medications on file prior to encounter.     Current Outpatient Medications on File Prior to Encounter   Medication Sig Dispense Refill    amLODIPine (NORVASC) 10 MG tablet Take 1 tablet by mouth daily      losartan-hydroCHLOROthiazide (HYZAAR) 100-12.5 MG per tablet Take 1 tablet by mouth daily      omeprazole (PRILOSEC) 20 MG delayed release capsule Take 1 capsule by mouth daily      polyethyl glycol-propyl glycol 0.4-0.3 % (SYSTANE) 0.4-0.3 % ophthalmic solution Place 1 drop into both eyes as needed for Dry Eyes      albuterol (PROVENTIL) (5 MG/ML) 0.5% nebulizer solution Take 1 mL by nebulization 4 times daily as needed for Wheezing 120 each 3    albuterol sulfate 
    NEUROSURGERY CONSULT  MATTEO BURNS  [id number]   1965   2/15/2024    Requesting physician: Danika Gregorio MD    Reason for consultation: carotid occlusion/stenosis     History of present illness: Patient is a 58 y.o. male w/ PMH of HTN, HLD, lung CA s/p lobectomy who presents with acute onset left hand weakness and numbness and speech difficulty.  Occurred around 7 am this morning, dropped a pencil out of his left hand and was unable to pick it back up with that hand. He attempted to call out for help but could not speak. He did notice left hand tingling at this time as well. Initial /94, NIHSS 2 for RIGHT nasolabial fold flattening and LEFT hemisensory deficit. CT head negative for hemorrhage or acute abnormality. CTA head and neck with R ICA occlusion and high-grade L ICA stenosis with soft plaque. Patient was started on neuro protocol heparin gtt and transferred to [facility] for stroke care and possible L ICA neurovascular intervention.     ROS:   GENERAL:  Denies fever or recent illness. Denies weight changes   EYES:  Denies vision change or diplopia  EARS:  Denies hearing loss  CARDIAC:  Denies chest pain  RESPIRATORY:  Denies shortness of breath  SKIN:  Denies rash or lesions   HEM:  Denies excessive bruising  PSYCH:  Denies anxiety or depression  NEURO: +weakness, +sensory deficit, +speech changes   :  Denies urinary difficulty  GI: Denies nausea, vomiting, diarrhea or constipation  MUSCULOSKELETAL:  No arthralgias    Allergies   Allergen Reactions    Atorvastatin Myalgia    Ibuprofen      Kidney damage        No past medical history on file.     No past surgical history on file.    Social History     Occupational History    Not on file   Tobacco Use    Smoking status: Some Days    Smokeless tobacco: Never   Vaping Use    Vaping Use: Never used   Substance and Sexual Activity    Alcohol use: Yes     Comment: social    Drug use: Yes     Types: Marijuana (Weed)    Sexual activity: Not on file    
 Motor right arm: 0=No drift, limb holds 90 (or 45) degrees for full 10 seconds   6a. motor left le=No drift, limb holds 90 (or 45) degrees for full 10 seconds   6b  Motor right le=No drift, limb holds 90 (or 45) degrees for full 10 seconds   7. Limb Ataxia: 0=Absent   8.  Sensory: 1=Mild to moderate sensory loss; patient feels pinprick is less sharp or is dull on the affected side; there is a loss of superficial pain with pinprick but patient is aware He is being touched   9. Best Language:  0 - no aphasia, normal   10. Dysarthria: 0=Normal   11. Extinction and Inattention: 0=No abnormality         Total:   2     Other exam findings of note: No evidence of aphasia - speech is fluent; repeats, names, follows commands, able to provide full history. Has mild/subtle R nasolabial fold flattening. Decreased sensation LEFT hemibody.        Labs:  CBC:   Recent Labs     02/15/24  0835   WBC 7.7   HGB 14.0        BMP:    Recent Labs     02/15/24  0835      K 4.0      CO2 23   BUN 23*   CREATININE 1.4*   GLUCOSE 115*     Ca/Mg/Phos:   Recent Labs     02/15/24  0835   CALCIUM 9.0     Troponin: No results for input(s): \"TROPONINI\" in the last 72 hours.  BNP: No results for input(s): \"BNP\" in the last 72 hours.  Lipids: No results for input(s): \"CHOL\", \"TRIG\", \"HDL\", \"LDLCALC\" in the last 72 hours.    Invalid input(s): \"LABVLDL\"  ABGs: No results for input(s): \"PHART\", \"PO2ART\", \"[id number]\" in the last 72 hours.  PT/INR:   Recent Labs     02/15/24  0835   PROTIME 12.4   INR 0.92     APTT:   Recent Labs     02/15/24  0835   APTT 29.6     HgA1C: Invalid input(s): \"HGBA1C\"    Radiology:    CTA Head and Neck 02/15/24  FINDINGS:             CTA NECK:     Normal three-vessel aortic arch. Right common artery and bilateral proximal  subclavian arteries are without significant stenosis.     Right common carotid artery is normal in caliber.     There is complete right internal carotid artery occlusion beginning

## 2024-02-16 NOTE — NURSE NAVIGATOR
CTA with R ICA occlusion and high-grade stenosis of L ICA (80-90%)       MRI notable for punctate diffusion signal abnormality in the posterior lateral temporal cortex compatible with a tiny distal infarct, see results     Patient's chart reviewed for Stroke Core Measures and additional needs:    [x]   Swallow screen prior to PO intake   [x]   Lipids / A1C ordered or resulted -  (2/15/24); A1c ordered, pending    [x]   Therapy ordered - Up with assist; PT/OT ordered     - Heparin gtt infusing, see eMAR   - Plan for f/u CTA in 5 days - see todays NCC note for recommendations   - ECHO ordered      Navigator to continue to follow patient while admitted, to assist with follow up and discharge planning as needed.     Nurse eSignature: Electronically signed by Josefina Gutierrez RN on 2/16/24 at 11:11 AM EST - Neuroscience Navigator

## 2024-02-17 LAB
ANION GAP SERPL CALCULATED.3IONS-SCNC: 13 MMOL/L (ref 3–16)
ANTI-XA UNFRAC HEPARIN: 0.47 IU/ML (ref 0.3–0.7)
ANTI-XA UNFRAC HEPARIN: 0.59 IU/ML (ref 0.3–0.7)
BASOPHILS # BLD: 0.1 K/UL (ref 0–0.2)
BASOPHILS NFR BLD: 0.8 %
BUN SERPL-MCNC: 16 MG/DL (ref 7–20)
CALCIUM SERPL-MCNC: 9 MG/DL (ref 8.3–10.6)
CHLORIDE SERPL-SCNC: 106 MMOL/L (ref 99–110)
CO2 SERPL-SCNC: 21 MMOL/L (ref 21–32)
CREAT SERPL-MCNC: 1.4 MG/DL (ref 0.9–1.3)
DEPRECATED RDW RBC AUTO: 14.8 % (ref 12.4–15.4)
EOSINOPHIL # BLD: 0.1 K/UL (ref 0–0.6)
EOSINOPHIL NFR BLD: 1.1 %
EST. AVERAGE GLUCOSE BLD GHB EST-MCNC: 125.5 MG/DL
GFR SERPLBLD CREATININE-BSD FMLA CKD-EPI: 58 ML/MIN/{1.73_M2}
GLUCOSE SERPL-MCNC: 113 MG/DL (ref 70–99)
HBA1C MFR BLD: 6 %
HCT VFR BLD AUTO: 45.8 % (ref 40.5–52.5)
HGB BLD-MCNC: 15 G/DL (ref 13.5–17.5)
LYMPHOCYTES # BLD: 2.9 K/UL (ref 1–5.1)
LYMPHOCYTES NFR BLD: 33 %
MAGNESIUM SERPL-MCNC: 2.2 MG/DL (ref 1.8–2.4)
MCH RBC QN AUTO: 27.6 PG (ref 26–34)
MCHC RBC AUTO-ENTMCNC: 32.7 G/DL (ref 31–36)
MCV RBC AUTO: 84.3 FL (ref 80–100)
MONOCYTES # BLD: 0.6 K/UL (ref 0–1.3)
MONOCYTES NFR BLD: 6.7 %
NEUTROPHILS # BLD: 5.1 K/UL (ref 1.7–7.7)
NEUTROPHILS NFR BLD: 58.4 %
PLATELET # BLD AUTO: 188 K/UL (ref 135–450)
PMV BLD AUTO: 10 FL (ref 5–10.5)
POTASSIUM SERPL-SCNC: 4.2 MMOL/L (ref 3.5–5.1)
RBC # BLD AUTO: 5.44 M/UL (ref 4.2–5.9)
SODIUM SERPL-SCNC: 140 MMOL/L (ref 136–145)
WBC # BLD AUTO: 8.7 K/UL (ref 4–11)

## 2024-02-17 PROCEDURE — 83735 ASSAY OF MAGNESIUM: CPT

## 2024-02-17 PROCEDURE — APPNB30 APP NON BILLABLE TIME 0-30 MINS

## 2024-02-17 PROCEDURE — 85025 COMPLETE CBC W/AUTO DIFF WBC: CPT

## 2024-02-17 PROCEDURE — 6360000002 HC RX W HCPCS: Performed by: STUDENT IN AN ORGANIZED HEALTH CARE EDUCATION/TRAINING PROGRAM

## 2024-02-17 PROCEDURE — 36415 COLL VENOUS BLD VENIPUNCTURE: CPT

## 2024-02-17 PROCEDURE — 2580000003 HC RX 258

## 2024-02-17 PROCEDURE — 99231 SBSQ HOSP IP/OBS SF/LOW 25: CPT | Performed by: NURSE PRACTITIONER

## 2024-02-17 PROCEDURE — 1200000000 HC SEMI PRIVATE

## 2024-02-17 PROCEDURE — 85520 HEPARIN ASSAY: CPT

## 2024-02-17 PROCEDURE — 6370000000 HC RX 637 (ALT 250 FOR IP)

## 2024-02-17 PROCEDURE — 99232 SBSQ HOSP IP/OBS MODERATE 35: CPT | Performed by: INTERNAL MEDICINE

## 2024-02-17 PROCEDURE — 80048 BASIC METABOLIC PNL TOTAL CA: CPT

## 2024-02-17 RX ADMIN — SODIUM CHLORIDE, PRESERVATIVE FREE 10 ML: 5 INJECTION INTRAVENOUS at 13:37

## 2024-02-17 RX ADMIN — ROSUVASTATIN CALCIUM 40 MG: 20 TABLET, FILM COATED ORAL at 21:19

## 2024-02-17 RX ADMIN — SODIUM CHLORIDE, PRESERVATIVE FREE 10 ML: 5 INJECTION INTRAVENOUS at 23:01

## 2024-02-17 RX ADMIN — HEPARIN SODIUM 17 UNITS/KG/HR: 10000 INJECTION, SOLUTION INTRAVENOUS at 13:36

## 2024-02-17 ASSESSMENT — PAIN SCALES - GENERAL
PAINLEVEL_OUTOF10: 0

## 2024-02-17 NOTE — PLAN OF CARE
Problem: Discharge Planning  Goal: Discharge to home or other facility with appropriate resources  2/16/2024 2327 by Angela Ding RN  Outcome: Progressing     Problem: Neurosensory - Adult  Goal: Achieves stable or improved neurological status  2/16/2024 2327 by Angela Ding RN  Outcome: Progressing     Problem: Neurosensory - Adult  Goal: Achieves maximal functionality and self care  2/16/2024 2327 by Angela Ding RN  Outcome: Progressing     Problem: Safety - Adult  Goal: Free from fall injury  2/16/2024 2327 by Angela Ding RN  Outcome: Progressing     Problem: ABCDS Injury Assessment  Goal: Absence of physical injury  2/16/2024 2327 by Angela Ding RN  Outcome: Progressing     Problem: Neurosensory - Adult  Goal: Achieves stable or improved neurological status  2/16/2024 2327 by Angela Ding RN  Outcome: Progressing  2/16/2024 1334 by Guerda Yoo RN  Outcome: Not Progressing

## 2024-02-17 NOTE — PLAN OF CARE
Problem: Discharge Planning  Goal: Discharge to home or other facility with appropriate resources  Outcome: Progressing  Flowsheets  Taken 2/17/2024 1200  Discharge to home or other facility with appropriate resources: Identify barriers to discharge with patient and caregiver  Taken 2/17/2024 0800  Discharge to home or other facility with appropriate resources: Identify barriers to discharge with patient and caregiver     Problem: Neurosensory - Adult  Goal: Achieves stable or improved neurological status  Outcome: Progressing  Flowsheets  Taken 2/17/2024 1200  Achieves stable or improved neurological status: Assess for and report changes in neurological status  Taken 2/17/2024 0800  Achieves stable or improved neurological status: Assess for and report changes in neurological status  Goal: Achieves maximal functionality and self care  Outcome: Progressing  Flowsheets  Taken 2/17/2024 1200  Achieves maximal functionality and self care: Monitor swallowing and airway patency with patient fatigue and changes in neurological status  Taken 2/17/2024 0800  Achieves maximal functionality and self care: Monitor swallowing and airway patency with patient fatigue and changes in neurological status     Problem: Safety - Adult  Goal: Free from fall injury  Outcome: Progressing  Flowsheets (Taken 2/17/2024 1400)  Free From Fall Injury: Instruct family/caregiver on patient safety     Problem: ABCDS Injury Assessment  Goal: Absence of physical injury  Outcome: Progressing  Flowsheets (Taken 2/17/2024 1400)  Absence of Physical Injury: Implement safety measures based on patient assessment

## 2024-02-18 LAB
ANION GAP SERPL CALCULATED.3IONS-SCNC: 14 MMOL/L (ref 3–16)
ANTI-XA UNFRAC HEPARIN: 0.49 IU/ML (ref 0.3–0.7)
BASOPHILS # BLD: 0.1 K/UL (ref 0–0.2)
BASOPHILS NFR BLD: 0.8 %
BUN SERPL-MCNC: 15 MG/DL (ref 7–20)
CALCIUM SERPL-MCNC: 9.1 MG/DL (ref 8.3–10.6)
CHLORIDE SERPL-SCNC: 105 MMOL/L (ref 99–110)
CO2 SERPL-SCNC: 20 MMOL/L (ref 21–32)
CREAT SERPL-MCNC: 1.4 MG/DL (ref 0.9–1.3)
DEPRECATED RDW RBC AUTO: 14.8 % (ref 12.4–15.4)
EOSINOPHIL # BLD: 0.1 K/UL (ref 0–0.6)
EOSINOPHIL NFR BLD: 1.6 %
GFR SERPLBLD CREATININE-BSD FMLA CKD-EPI: 58 ML/MIN/{1.73_M2}
GLUCOSE SERPL-MCNC: 111 MG/DL (ref 70–99)
HCT VFR BLD AUTO: 48 % (ref 40.5–52.5)
HGB BLD-MCNC: 15.1 G/DL (ref 13.5–17.5)
LYMPHOCYTES # BLD: 2.6 K/UL (ref 1–5.1)
LYMPHOCYTES NFR BLD: 28.4 %
MAGNESIUM SERPL-MCNC: 2.1 MG/DL (ref 1.8–2.4)
MCH RBC QN AUTO: 26.8 PG (ref 26–34)
MCHC RBC AUTO-ENTMCNC: 31.6 G/DL (ref 31–36)
MCV RBC AUTO: 84.8 FL (ref 80–100)
MONOCYTES # BLD: 0.7 K/UL (ref 0–1.3)
MONOCYTES NFR BLD: 7.2 %
NEUTROPHILS # BLD: 5.6 K/UL (ref 1.7–7.7)
NEUTROPHILS NFR BLD: 62 %
PLATELET # BLD AUTO: 174 K/UL (ref 135–450)
PMV BLD AUTO: 9.1 FL (ref 5–10.5)
POTASSIUM SERPL-SCNC: 4.3 MMOL/L (ref 3.5–5.1)
RBC # BLD AUTO: 5.65 M/UL (ref 4.2–5.9)
SODIUM SERPL-SCNC: 139 MMOL/L (ref 136–145)
WBC # BLD AUTO: 9 K/UL (ref 4–11)

## 2024-02-18 PROCEDURE — 99231 SBSQ HOSP IP/OBS SF/LOW 25: CPT | Performed by: NURSE PRACTITIONER

## 2024-02-18 PROCEDURE — 83735 ASSAY OF MAGNESIUM: CPT

## 2024-02-18 PROCEDURE — 85520 HEPARIN ASSAY: CPT

## 2024-02-18 PROCEDURE — 6370000000 HC RX 637 (ALT 250 FOR IP): Performed by: NURSE PRACTITIONER

## 2024-02-18 PROCEDURE — 1200000000 HC SEMI PRIVATE

## 2024-02-18 PROCEDURE — 2580000003 HC RX 258

## 2024-02-18 PROCEDURE — 85025 COMPLETE CBC W/AUTO DIFF WBC: CPT

## 2024-02-18 PROCEDURE — 80048 BASIC METABOLIC PNL TOTAL CA: CPT

## 2024-02-18 PROCEDURE — 6360000002 HC RX W HCPCS

## 2024-02-18 PROCEDURE — 36415 COLL VENOUS BLD VENIPUNCTURE: CPT

## 2024-02-18 PROCEDURE — 6370000000 HC RX 637 (ALT 250 FOR IP)

## 2024-02-18 RX ORDER — MECOBALAMIN 5000 MCG
5 TABLET,DISINTEGRATING ORAL NIGHTLY PRN
Status: DISCONTINUED | OUTPATIENT
Start: 2024-02-18 | End: 2024-02-19

## 2024-02-18 RX ADMIN — ROSUVASTATIN CALCIUM 40 MG: 20 TABLET, FILM COATED ORAL at 20:31

## 2024-02-18 RX ADMIN — SODIUM CHLORIDE: 9 INJECTION, SOLUTION INTRAVENOUS at 19:06

## 2024-02-18 RX ADMIN — ACETAMINOPHEN 650 MG: 325 TABLET ORAL at 04:55

## 2024-02-18 RX ADMIN — SODIUM CHLORIDE, PRESERVATIVE FREE 10 ML: 5 INJECTION INTRAVENOUS at 20:33

## 2024-02-18 RX ADMIN — Medication 5 MG: at 20:31

## 2024-02-18 RX ADMIN — HEPARIN SODIUM 17 UNITS/KG/HR: 10000 INJECTION, SOLUTION INTRAVENOUS at 19:34

## 2024-02-18 RX ADMIN — HEPARIN SODIUM 17 UNITS/KG/HR: 10000 INJECTION, SOLUTION INTRAVENOUS at 04:51

## 2024-02-18 ASSESSMENT — PAIN SCALES - GENERAL: PAINLEVEL_OUTOF10: 0

## 2024-02-18 NOTE — PLAN OF CARE
No changes to neuro exam. Patient therapeutic on neuro heparin protocol.     No changes to plan of care at this time. Please call with any exam changes or questions.    VIPUL Whitfield - CNP   Neurology & Neurocritical Care   2/17/2024 8:32 PM    ICU Patients:   Neurocritical Care Line: [phone]  [facility]: [facility]

## 2024-02-18 NOTE — PLAN OF CARE
Problem: Discharge Planning  Goal: Discharge to home or other facility with appropriate resources  2/17/2024 2320 by Angela Ding RN  Outcome: Progressing  Flowsheets (Taken 2/17/2024 1600 by Manisha Moralez RN)  Discharge to home or other facility with appropriate resources: Identify barriers to discharge with patient and caregiver     Problem: Neurosensory - Adult  Goal: Achieves stable or improved neurological status  2/17/2024 2320 by Angela Ding RN  Outcome: Progressing  Flowsheets (Taken 2/17/2024 1600 by Manisha Moralez RN)  Achieves stable or improved neurological status:   Assess for and report changes in neurological status   Initiate measures to prevent increased intracranial pressure     Problem: Neurosensory - Adult  Goal: Achieves maximal functionality and self care  2/17/2024 2320 by Angela Ding RN  Outcome: Progressing  Flowsheets (Taken 2/17/2024 1600 by Manisha Moralez RN)  Achieves maximal functionality and self care: Encourage and assist patient to increase activity and self care with guidance from physical therapy/occupational therapy  2/17/2024 1425 by Manisha Moralez RN  Outcome: Progressing  Flowsheets  Taken 2/17/2024 1400  Achieves maximal functionality and self care: Encourage and assist patient to increase activity and self care with guidance from physical therapy/occupational therapy  Taken 2/17/2024 1200  Achieves maximal functionality and self care: Monitor swallowing and airway patency with patient fatigue and changes in neurological status  Taken 2/17/2024 0800  Achieves maximal functionality and self care: Monitor swallowing and airway patency with patient fatigue and changes in neurological status     Problem: Safety - Adult  Goal: Free from fall injury  2/17/2024 2320 by Angela Ding RN  Outcome: Progressing     Problem: ABCDS Injury Assessment  Goal: Absence of physical injury  2/17/2024 2320 by Angela Ding RN  Outcome: Progressing

## 2024-02-18 NOTE — PLAN OF CARE
Problem: Discharge Planning  Goal: Discharge to home or other facility with appropriate resources  2/18/2024 0945 by Arlene Hurley RN  Outcome: Progressing  2/17/2024 2320 by Angela Ding RN  Outcome: Progressing  Flowsheets (Taken 2/17/2024 1600 by Manisha Moralez RN)  Discharge to home or other facility with appropriate resources: Identify barriers to discharge with patient and caregiver     Problem: Neurosensory - Adult  Goal: Achieves stable or improved neurological status  2/18/2024 0945 by Arlene Hurley RN  Outcome: Progressing  2/17/2024 2320 by Angela Ding RN  Outcome: Progressing  Flowsheets (Taken 2/17/2024 1600 by Manisha Moralez RN)  Achieves stable or improved neurological status:   Assess for and report changes in neurological status   Initiate measures to prevent increased intracranial pressure  Goal: Achieves maximal functionality and self care  2/18/2024 0945 by Arlene Hurley RN  Outcome: Progressing  2/17/2024 2320 by Angela Ding RN  Outcome: Progressing  Flowsheets (Taken 2/17/2024 1600 by Manisha Moralez RN)  Achieves maximal functionality and self care: Encourage and assist patient to increase activity and self care with guidance from physical therapy/occupational therapy     Problem: Safety - Adult  Goal: Free from fall injury  2/18/2024 0945 by Arlene Hurley RN  Outcome: Progressing  2/17/2024 2320 by Angela Ding RN  Outcome: Progressing     Problem: ABCDS Injury Assessment  Goal: Absence of physical injury  2/18/2024 0945 by Arlene Hurley RN  Outcome: Progressing  2/17/2024 2320 by Angela Ding RN  Outcome: Progressing

## 2024-02-19 LAB
ANION GAP SERPL CALCULATED.3IONS-SCNC: 14 MMOL/L (ref 3–16)
ANTI-XA UNFRAC HEPARIN: 0.34 IU/ML (ref 0.3–0.7)
ANTI-XA UNFRAC HEPARIN: 0.48 IU/ML (ref 0.3–0.7)
ANTI-XA UNFRAC HEPARIN: 0.56 IU/ML (ref 0.3–0.7)
BASOPHILS # BLD: 0 K/UL (ref 0–0.2)
BASOPHILS NFR BLD: 0.5 %
BUN SERPL-MCNC: 15 MG/DL (ref 7–20)
CALCIUM SERPL-MCNC: 9.6 MG/DL (ref 8.3–10.6)
CHLORIDE SERPL-SCNC: 105 MMOL/L (ref 99–110)
CO2 SERPL-SCNC: 21 MMOL/L (ref 21–32)
CREAT SERPL-MCNC: 1.4 MG/DL (ref 0.9–1.3)
DEPRECATED RDW RBC AUTO: 14.9 % (ref 12.4–15.4)
EOSINOPHIL # BLD: 0.1 K/UL (ref 0–0.6)
EOSINOPHIL NFR BLD: 1.4 %
GFR SERPLBLD CREATININE-BSD FMLA CKD-EPI: 58 ML/MIN/{1.73_M2}
GLUCOSE SERPL-MCNC: 106 MG/DL (ref 70–99)
HCT VFR BLD AUTO: 45.9 % (ref 40.5–52.5)
HGB BLD-MCNC: 14.8 G/DL (ref 13.5–17.5)
LYMPHOCYTES # BLD: 2.5 K/UL (ref 1–5.1)
LYMPHOCYTES NFR BLD: 26.4 %
MAGNESIUM SERPL-MCNC: 2 MG/DL (ref 1.8–2.4)
MCH RBC QN AUTO: 27.1 PG (ref 26–34)
MCHC RBC AUTO-ENTMCNC: 32.1 G/DL (ref 31–36)
MCV RBC AUTO: 84.3 FL (ref 80–100)
MONOCYTES # BLD: 0.6 K/UL (ref 0–1.3)
MONOCYTES NFR BLD: 6.8 %
NEUTROPHILS # BLD: 6.1 K/UL (ref 1.7–7.7)
NEUTROPHILS NFR BLD: 64.9 %
PLATELET # BLD AUTO: 188 K/UL (ref 135–450)
PMV BLD AUTO: 9.7 FL (ref 5–10.5)
POTASSIUM SERPL-SCNC: 3.9 MMOL/L (ref 3.5–5.1)
RBC # BLD AUTO: 5.45 M/UL (ref 4.2–5.9)
SODIUM SERPL-SCNC: 140 MMOL/L (ref 136–145)
WBC # BLD AUTO: 9.4 K/UL (ref 4–11)

## 2024-02-19 PROCEDURE — 6360000002 HC RX W HCPCS

## 2024-02-19 PROCEDURE — 6370000000 HC RX 637 (ALT 250 FOR IP): Performed by: STUDENT IN AN ORGANIZED HEALTH CARE EDUCATION/TRAINING PROGRAM

## 2024-02-19 PROCEDURE — 36415 COLL VENOUS BLD VENIPUNCTURE: CPT

## 2024-02-19 PROCEDURE — 83735 ASSAY OF MAGNESIUM: CPT

## 2024-02-19 PROCEDURE — 6370000000 HC RX 637 (ALT 250 FOR IP)

## 2024-02-19 PROCEDURE — 6370000000 HC RX 637 (ALT 250 FOR IP): Performed by: NURSE PRACTITIONER

## 2024-02-19 PROCEDURE — 2580000003 HC RX 258

## 2024-02-19 PROCEDURE — 85520 HEPARIN ASSAY: CPT

## 2024-02-19 PROCEDURE — 1200000000 HC SEMI PRIVATE

## 2024-02-19 PROCEDURE — 99231 SBSQ HOSP IP/OBS SF/LOW 25: CPT | Performed by: NURSE PRACTITIONER

## 2024-02-19 PROCEDURE — 80048 BASIC METABOLIC PNL TOTAL CA: CPT

## 2024-02-19 PROCEDURE — 85025 COMPLETE CBC W/AUTO DIFF WBC: CPT

## 2024-02-19 RX ORDER — PANTOPRAZOLE SODIUM 40 MG/1
40 TABLET, DELAYED RELEASE ORAL
Status: DISCONTINUED | OUTPATIENT
Start: 2024-02-19 | End: 2024-02-22 | Stop reason: HOSPADM

## 2024-02-19 RX ORDER — ALBUTEROL SULFATE 2.5 MG/3ML
5 SOLUTION RESPIRATORY (INHALATION) 4 TIMES DAILY PRN
Status: DISCONTINUED | OUTPATIENT
Start: 2024-02-19 | End: 2024-02-22 | Stop reason: HOSPADM

## 2024-02-19 RX ORDER — MECOBALAMIN 5000 MCG
10 TABLET,DISINTEGRATING ORAL NIGHTLY PRN
Status: DISCONTINUED | OUTPATIENT
Start: 2024-02-19 | End: 2024-02-22 | Stop reason: HOSPADM

## 2024-02-19 RX ADMIN — HEPARIN SODIUM 15 UNITS/KG/HR: 10000 INJECTION, SOLUTION INTRAVENOUS at 22:25

## 2024-02-19 RX ADMIN — ACETAMINOPHEN 650 MG: 325 TABLET ORAL at 17:50

## 2024-02-19 RX ADMIN — Medication 10 MG: at 21:05

## 2024-02-19 RX ADMIN — SODIUM CHLORIDE, PRESERVATIVE FREE 10 ML: 5 INJECTION INTRAVENOUS at 20:28

## 2024-02-19 RX ADMIN — ACETAMINOPHEN 650 MG: 325 TABLET ORAL at 04:12

## 2024-02-19 RX ADMIN — PANTOPRAZOLE SODIUM 40 MG: 40 TABLET, DELAYED RELEASE ORAL at 09:00

## 2024-02-19 RX ADMIN — ROSUVASTATIN CALCIUM 40 MG: 20 TABLET, FILM COATED ORAL at 20:27

## 2024-02-19 RX ADMIN — POLYETHYLENE GLYCOL 3350 17 G: 17 POWDER, FOR SOLUTION ORAL at 08:10

## 2024-02-19 ASSESSMENT — PAIN DESCRIPTION - ONSET
ONSET: ON-GOING
ONSET: ON-GOING

## 2024-02-19 ASSESSMENT — PAIN SCALES - GENERAL
PAINLEVEL_OUTOF10: 4
PAINLEVEL_OUTOF10: 4

## 2024-02-19 ASSESSMENT — PAIN DESCRIPTION - ORIENTATION
ORIENTATION: ANTERIOR
ORIENTATION: ANTERIOR

## 2024-02-19 ASSESSMENT — PAIN DESCRIPTION - LOCATION
LOCATION: HAND
LOCATION: HEAD

## 2024-02-19 ASSESSMENT — PAIN DESCRIPTION - PAIN TYPE
TYPE: ACUTE PAIN
TYPE: ACUTE PAIN

## 2024-02-19 ASSESSMENT — PAIN - FUNCTIONAL ASSESSMENT
PAIN_FUNCTIONAL_ASSESSMENT: ACTIVITIES ARE NOT PREVENTED
PAIN_FUNCTIONAL_ASSESSMENT: ACTIVITIES ARE NOT PREVENTED

## 2024-02-19 ASSESSMENT — PAIN DESCRIPTION - FREQUENCY
FREQUENCY: CONTINUOUS
FREQUENCY: CONTINUOUS

## 2024-02-19 ASSESSMENT — PAIN DESCRIPTION - DESCRIPTORS
DESCRIPTORS: PRESSURE
DESCRIPTORS: ACHING

## 2024-02-19 NOTE — PLAN OF CARE
Problem: Discharge Planning  Goal: Discharge to home or other facility with appropriate resources  2/18/2024 2109 by Angela Ding, RN  Outcome: Progressing     Problem: Neurosensory - Adult  Goal: Achieves stable or improved neurological status  2/18/2024 2109 by Angela Ding, RN  Outcome: Progressing     Problem: Neurosensory - Adult  Goal: Achieves maximal functionality and self care  2/18/2024 2109 by Angela Ding, RN  Outcome: Progressing     Problem: Safety - Adult  Goal: Free from fall injury  2/18/2024 2109 by Angela Ding, RN  Outcome: Progressing     Problem: ABCDS Injury Assessment  Goal: Absence of physical injury  2/18/2024 2109 by Angela Ding, RN  Outcome: Progressing

## 2024-02-20 ENCOUNTER — APPOINTMENT (OUTPATIENT)
Dept: CT IMAGING | Age: 59
DRG: 030 | End: 2024-02-20
Payer: MEDICAID

## 2024-02-20 LAB
ANION GAP SERPL CALCULATED.3IONS-SCNC: 13 MMOL/L (ref 3–16)
ANTI-XA UNFRAC HEPARIN: 0.51 IU/ML (ref 0.3–0.7)
BASOPHILS # BLD: 0.1 K/UL (ref 0–0.2)
BASOPHILS NFR BLD: 0.6 %
BUN SERPL-MCNC: 16 MG/DL (ref 7–20)
CALCIUM SERPL-MCNC: 9.7 MG/DL (ref 8.3–10.6)
CHLORIDE SERPL-SCNC: 106 MMOL/L (ref 99–110)
CO2 SERPL-SCNC: 22 MMOL/L (ref 21–32)
CREAT SERPL-MCNC: 1.5 MG/DL (ref 0.9–1.3)
DEPRECATED RDW RBC AUTO: 14.7 % (ref 12.4–15.4)
EOSINOPHIL # BLD: 0.2 K/UL (ref 0–0.6)
EOSINOPHIL NFR BLD: 2.1 %
GFR SERPLBLD CREATININE-BSD FMLA CKD-EPI: 54 ML/MIN/{1.73_M2}
GLUCOSE SERPL-MCNC: 104 MG/DL (ref 70–99)
HCT VFR BLD AUTO: 44.8 % (ref 40.5–52.5)
HGB BLD-MCNC: 14.5 G/DL (ref 13.5–17.5)
LYMPHOCYTES # BLD: 2.6 K/UL (ref 1–5.1)
LYMPHOCYTES NFR BLD: 26.6 %
MAGNESIUM SERPL-MCNC: 2 MG/DL (ref 1.8–2.4)
MCH RBC QN AUTO: 27.2 PG (ref 26–34)
MCHC RBC AUTO-ENTMCNC: 32.3 G/DL (ref 31–36)
MCV RBC AUTO: 84.1 FL (ref 80–100)
MONOCYTES # BLD: 0.8 K/UL (ref 0–1.3)
MONOCYTES NFR BLD: 7.8 %
NEUTROPHILS # BLD: 6.2 K/UL (ref 1.7–7.7)
NEUTROPHILS NFR BLD: 62.9 %
PLATELET # BLD AUTO: 164 K/UL (ref 135–450)
PMV BLD AUTO: 9.6 FL (ref 5–10.5)
POTASSIUM SERPL-SCNC: 4.2 MMOL/L (ref 3.5–5.1)
RBC # BLD AUTO: 5.32 M/UL (ref 4.2–5.9)
SODIUM SERPL-SCNC: 141 MMOL/L (ref 136–145)
WBC # BLD AUTO: 9.8 K/UL (ref 4–11)

## 2024-02-20 PROCEDURE — 6370000000 HC RX 637 (ALT 250 FOR IP): Performed by: STUDENT IN AN ORGANIZED HEALTH CARE EDUCATION/TRAINING PROGRAM

## 2024-02-20 PROCEDURE — 36415 COLL VENOUS BLD VENIPUNCTURE: CPT

## 2024-02-20 PROCEDURE — 6370000000 HC RX 637 (ALT 250 FOR IP)

## 2024-02-20 PROCEDURE — 2580000003 HC RX 258

## 2024-02-20 PROCEDURE — 83735 ASSAY OF MAGNESIUM: CPT

## 2024-02-20 PROCEDURE — 80048 BASIC METABOLIC PNL TOTAL CA: CPT

## 2024-02-20 PROCEDURE — 70498 CT ANGIOGRAPHY NECK: CPT

## 2024-02-20 PROCEDURE — 6360000004 HC RX CONTRAST MEDICATION: Performed by: STUDENT IN AN ORGANIZED HEALTH CARE EDUCATION/TRAINING PROGRAM

## 2024-02-20 PROCEDURE — 99231 SBSQ HOSP IP/OBS SF/LOW 25: CPT | Performed by: NURSE PRACTITIONER

## 2024-02-20 PROCEDURE — 6370000000 HC RX 637 (ALT 250 FOR IP): Performed by: NURSE PRACTITIONER

## 2024-02-20 PROCEDURE — 99232 SBSQ HOSP IP/OBS MODERATE 35: CPT

## 2024-02-20 PROCEDURE — 85520 HEPARIN ASSAY: CPT

## 2024-02-20 PROCEDURE — 85025 COMPLETE CBC W/AUTO DIFF WBC: CPT

## 2024-02-20 PROCEDURE — 1200000000 HC SEMI PRIVATE

## 2024-02-20 RX ORDER — CLOPIDOGREL BISULFATE 75 MG/1
75 TABLET ORAL DAILY
Status: DISCONTINUED | OUTPATIENT
Start: 2024-02-21 | End: 2024-02-22 | Stop reason: HOSPADM

## 2024-02-20 RX ORDER — ASPIRIN 81 MG/1
324 TABLET, CHEWABLE ORAL DAILY
Status: DISCONTINUED | OUTPATIENT
Start: 2024-02-20 | End: 2024-02-22 | Stop reason: HOSPADM

## 2024-02-20 RX ORDER — CLOPIDOGREL BISULFATE 75 MG/1
300 TABLET ORAL ONCE
Status: COMPLETED | OUTPATIENT
Start: 2024-02-20 | End: 2024-02-20

## 2024-02-20 RX ADMIN — CLOPIDOGREL BISULFATE 300 MG: 75 TABLET ORAL at 12:21

## 2024-02-20 RX ADMIN — Medication 10 MG: at 20:09

## 2024-02-20 RX ADMIN — SODIUM CHLORIDE, PRESERVATIVE FREE 10 ML: 5 INJECTION INTRAVENOUS at 09:12

## 2024-02-20 RX ADMIN — IOPAMIDOL 75 ML: 755 INJECTION, SOLUTION INTRAVENOUS at 06:42

## 2024-02-20 RX ADMIN — ACETAMINOPHEN 650 MG: 325 TABLET ORAL at 00:24

## 2024-02-20 RX ADMIN — ASPIRIN 81 MG 324 MG: 81 TABLET ORAL at 12:21

## 2024-02-20 RX ADMIN — ROSUVASTATIN CALCIUM 40 MG: 20 TABLET, FILM COATED ORAL at 20:09

## 2024-02-20 RX ADMIN — PANTOPRAZOLE SODIUM 40 MG: 40 TABLET, DELAYED RELEASE ORAL at 09:12

## 2024-02-20 RX ADMIN — SODIUM CHLORIDE, PRESERVATIVE FREE 10 ML: 5 INJECTION INTRAVENOUS at 20:09

## 2024-02-20 ASSESSMENT — PAIN DESCRIPTION - LOCATION
LOCATION: HEAD
LOCATION: HEAD

## 2024-02-20 ASSESSMENT — PAIN DESCRIPTION - DESCRIPTORS
DESCRIPTORS: ACHING
DESCRIPTORS: ACHING

## 2024-02-20 ASSESSMENT — PAIN SCALES - GENERAL
PAINLEVEL_OUTOF10: 0
PAINLEVEL_OUTOF10: 3
PAINLEVEL_OUTOF10: 3
PAINLEVEL_OUTOF10: 0

## 2024-02-20 ASSESSMENT — PAIN - FUNCTIONAL ASSESSMENT: PAIN_FUNCTIONAL_ASSESSMENT: ACTIVITIES ARE NOT PREVENTED

## 2024-02-20 ASSESSMENT — PAIN DESCRIPTION - PAIN TYPE: TYPE: ACUTE PAIN

## 2024-02-20 NOTE — PLAN OF CARE
Problem: Discharge Planning  Goal: Discharge to home or other facility with appropriate resources  Outcome: Progressing     Problem: Neurosensory - Adult  Goal: Achieves stable or improved neurological status  Outcome: Progressing  Goal: Achieves maximal functionality and self care  Outcome: Progressing     Problem: Safety - Adult  Goal: Free from fall injury  Outcome: Progressing     Problem: ABCDS Injury Assessment  Goal: Absence of physical injury  Outcome: Progressing     Problem: Pain  Goal: Verbalizes/displays adequate comfort level or baseline comfort level  Outcome: Progressing

## 2024-02-20 NOTE — PLAN OF CARE
Problem: Neurosensory - Adult  Goal: Achieves stable or improved neurological status  2/20/2024 1805 by Mariam Cesar, RN  Note: VSS, NIH negative, no neuro deficits noted. Will cont to monitor neurologic status.     Problem: Safety - Adult  Goal: Free from fall injury  2/20/2024 1805 by Mariam Cesar, RN  Note: Up ad janes to BR with steady gait. Instructed to call for assist if needed when ambulating. Nonskid socks on. Bed locked and in lowest position. Side rails up x2. Call light in reach. Remains free from injury. Will cont to monitor safety.     Problem: Pain  Goal: Verbalizes/displays adequate comfort level or baseline comfort level  2/20/2024 1805 by Mariam Cesar, RN  Note: Denies pain. Instructed to call for needs.

## 2024-02-20 NOTE — PLAN OF CARE
I have spoken with the NP from Neurosurgery regarding this patient and have reviewed the chart and images. Imaging shows recanalization of the right internal carotid with residual string sign in the bulb, but new visible contrast along the entire cervical segment to the terminus.  Briefly, patient is appropriate for admission for stroke from symptomatic right carotid stenosis.      The immediate plan of care will involve   -stop heparin gtt  -load Plavix today and start 75mg daily  -start aspirin 325mg daily today  -plan angiography tomorrow for possible VALE stent.

## 2024-02-20 NOTE — PLAN OF CARE
Brief note:  Juan Cantor is a 57 y/o man who presented with left sided numbness and left hand weakness found to have right ICA occlussion and severe left ICA stenosis.   Heparin gtt started 2/15. Given suspicion for associated thrombus, neurosurgery recommended heparin gtt x 5 days with repeat CT-A & further planning at that point.    Currently, patient reports having left sided head \"pressure\" rated at 3/10 but has no other complaints at this time. States the head pressure does not feel achy.     PHYSICAL EXAM:  Vitals:    02/19/24 1118 02/19/24 1300 02/19/24 1730 02/19/24 2000   BP: (!) 149/89 (!) 155/103 (!) 171/103 (!) 144/108   Pulse: 65  76    Resp: 16 16 19   Temp: 97.7 °F (36.5 °C)   98 °F (36.7 °C)   TempSrc: Oral   Oral   SpO2: 100%  100% 100%   Weight:       Height:             General: Alert, no distress, well-nourished  Neurologic  Mental status:   orientation to person, place, time, situation   Attention intact as able to attend well to the exam     Language fluent in conversation   Comprehension intact; follows simple commands    Cranial nerves:   CN2: Visual fields full w/o extinction on confrontational testing   CN 3,4,6: Pupils equal and reactive to light, extraocular muscles intact  CN5: Facial sensation symmetric   CN7: Face symmetric  CN8: Hearing symmetric to spoken voice  CN9: Palate elevated symmetrically  CN11: Traps full strength on shoulder shrug  CN12: Tongue midline with protrusion    Motor Exam:  5/5 strength throughout all four extremities.       Sensory: light touch intact and symmetric in all 4 extremities.  No sensory extinction on bilateral simultaneous stimulation  Cerebellar/coordination: finger nose finger normal without ataxia  Tone: normal in all 4 extremities  Gait: Deferred for safety      OTHER SYSTEMS:  Cardiovascular: Warm, appears well perfused   Respiratory: Easy, non-labored respiratory pattern   Abdominal: Abdomen is without distention   Extremities: Upper and

## 2024-02-20 NOTE — PLAN OF CARE
Problem: Discharge Planning  Goal: Discharge to home or other facility with appropriate resources  2/20/2024 1353 by Windy Murillo RN  Outcome: Progressing     Problem: Neurosensory - Adult  Goal: Achieves stable or improved neurological status  2/20/2024 1353 by Windy Murillo RN  Outcome: Progressing     Problem: Neurosensory - Adult  Goal: Achieves maximal functionality and self care  2/20/2024 1353 by Windy Murillo RN  Outcome: Progressing     Problem: Safety - Adult  Goal: Free from fall injury  2/20/2024 1353 by Windy Murillo RN  Outcome: Progressing     Problem: ABCDS Injury Assessment  Goal: Absence of physical injury  2/20/2024 1353 by Windy Murillo RN  Outcome: Progressing     Problem: Pain  Goal: Verbalizes/displays adequate comfort level or baseline comfort level  2/20/2024 1353 by Windy Murillo, RN  Outcome: Progressing

## 2024-02-21 ENCOUNTER — HOSPITAL ENCOUNTER (INPATIENT)
Dept: INTERVENTIONAL RADIOLOGY/VASCULAR | Age: 59
Discharge: HOME | DRG: 030 | End: 2024-02-21
Payer: MEDICAID

## 2024-02-21 LAB
ANION GAP SERPL CALCULATED.3IONS-SCNC: 14 MMOL/L (ref 3–16)
BASOPHILS # BLD: 0.1 K/UL (ref 0–0.2)
BASOPHILS NFR BLD: 0.7 %
BUN SERPL-MCNC: 17 MG/DL (ref 7–20)
CALCIUM SERPL-MCNC: 9.9 MG/DL (ref 8.3–10.6)
CHLORIDE SERPL-SCNC: 104 MMOL/L (ref 99–110)
CO2 SERPL-SCNC: 20 MMOL/L (ref 21–32)
CREAT SERPL-MCNC: 1.6 MG/DL (ref 0.9–1.3)
DEPRECATED RDW RBC AUTO: 14.8 % (ref 12.4–15.4)
EOSINOPHIL # BLD: 0.2 K/UL (ref 0–0.6)
EOSINOPHIL NFR BLD: 1.5 %
GFR SERPLBLD CREATININE-BSD FMLA CKD-EPI: 50 ML/MIN/{1.73_M2}
GLUCOSE SERPL-MCNC: 95 MG/DL (ref 70–99)
HCT VFR BLD AUTO: 46.6 % (ref 40.5–52.5)
HGB BLD-MCNC: 14.9 G/DL (ref 13.5–17.5)
INR PPP: 0.95 (ref 0.84–1.16)
LYMPHOCYTES # BLD: 2.1 K/UL (ref 1–5.1)
LYMPHOCYTES NFR BLD: 19.1 %
MAGNESIUM SERPL-MCNC: 2.1 MG/DL (ref 1.8–2.4)
MCH RBC QN AUTO: 26.7 PG (ref 26–34)
MCHC RBC AUTO-ENTMCNC: 32 G/DL (ref 31–36)
MCV RBC AUTO: 83.5 FL (ref 80–100)
MONOCYTES # BLD: 1 K/UL (ref 0–1.3)
MONOCYTES NFR BLD: 9.3 %
NEUTROPHILS # BLD: 7.6 K/UL (ref 1.7–7.7)
NEUTROPHILS NFR BLD: 69.4 %
PLATELET # BLD AUTO: 177 K/UL (ref 135–450)
PMV BLD AUTO: 9.7 FL (ref 5–10.5)
POTASSIUM SERPL-SCNC: 4.4 MMOL/L (ref 3.5–5.1)
PROTHROMBIN TIME: 12.7 SEC (ref 11.5–14.8)
RBC # BLD AUTO: 5.58 M/UL (ref 4.2–5.9)
SODIUM SERPL-SCNC: 138 MMOL/L (ref 136–145)
WBC # BLD AUTO: 10.9 K/UL (ref 4–11)

## 2024-02-21 PROCEDURE — C1887 CATHETER, GUIDING: HCPCS

## 2024-02-21 PROCEDURE — 6360000004 HC RX CONTRAST MEDICATION: Performed by: NEUROLOGICAL SURGERY

## 2024-02-21 PROCEDURE — 85025 COMPLETE CBC W/AUTO DIFF WBC: CPT

## 2024-02-21 PROCEDURE — 83735 ASSAY OF MAGNESIUM: CPT

## 2024-02-21 PROCEDURE — 99152 MOD SED SAME PHYS/QHP 5/>YRS: CPT

## 2024-02-21 PROCEDURE — B3181ZZ FLUOROSCOPY OF BILATERAL INTERNAL CAROTID ARTERIES USING LOW OSMOLAR CONTRAST: ICD-10-PCS | Performed by: NEUROLOGICAL SURGERY

## 2024-02-21 PROCEDURE — 037K34Z DILATION OF RIGHT INTERNAL CAROTID ARTERY WITH DRUG-ELUTING INTRALUMINAL DEVICE, PERCUTANEOUS APPROACH: ICD-10-PCS | Performed by: NEUROLOGICAL SURGERY

## 2024-02-21 PROCEDURE — C1894 INTRO/SHEATH, NON-LASER: HCPCS

## 2024-02-21 PROCEDURE — 61635 INTRACRAN ANGIOPLSTY W/STENT: CPT

## 2024-02-21 PROCEDURE — 85610 PROTHROMBIN TIME: CPT

## 2024-02-21 PROCEDURE — 80048 BASIC METABOLIC PNL TOTAL CA: CPT

## 2024-02-21 PROCEDURE — 2580000003 HC RX 258

## 2024-02-21 PROCEDURE — 99153 MOD SED SAME PHYS/QHP EA: CPT

## 2024-02-21 PROCEDURE — 37215 TRANSCATH STENT CCA W/EPS: CPT

## 2024-02-21 PROCEDURE — B41F1ZZ FLUOROSCOPY OF RIGHT LOWER EXTREMITY ARTERIES USING LOW OSMOLAR CONTRAST: ICD-10-PCS | Performed by: NEUROLOGICAL SURGERY

## 2024-02-21 PROCEDURE — 2709999900 HC NON-CHARGEABLE SUPPLY

## 2024-02-21 PROCEDURE — 6370000000 HC RX 637 (ALT 250 FOR IP): Performed by: NURSE PRACTITIONER

## 2024-02-21 PROCEDURE — C1876 STENT, NON-COA/NON-COV W/DEL: HCPCS

## 2024-02-21 PROCEDURE — 36415 COLL VENOUS BLD VENIPUNCTURE: CPT

## 2024-02-21 PROCEDURE — 6370000000 HC RX 637 (ALT 250 FOR IP)

## 2024-02-21 PROCEDURE — APPNB45 APP NON BILLABLE 31-45 MINUTES

## 2024-02-21 PROCEDURE — C1884 EMBOLIZATION PROTECT SYST: HCPCS

## 2024-02-21 PROCEDURE — 99231 SBSQ HOSP IP/OBS SF/LOW 25: CPT | Performed by: NURSE PRACTITIONER

## 2024-02-21 PROCEDURE — 1200000000 HC SEMI PRIVATE

## 2024-02-21 PROCEDURE — C1769 GUIDE WIRE: HCPCS

## 2024-02-21 PROCEDURE — C1760 CLOSURE DEV, VASC: HCPCS

## 2024-02-21 RX ORDER — MORPHINE SULFATE 2 MG/ML
2 INJECTION, SOLUTION INTRAMUSCULAR; INTRAVENOUS
Status: CANCELLED | OUTPATIENT
Start: 2024-02-21

## 2024-02-21 RX ORDER — SENNOSIDES A AND B 8.6 MG/1
1 TABLET, FILM COATED ORAL DAILY PRN
Status: DISCONTINUED | OUTPATIENT
Start: 2024-02-21 | End: 2024-02-22 | Stop reason: HOSPADM

## 2024-02-21 RX ORDER — SENNOSIDES A AND B 8.6 MG/1
1 TABLET, FILM COATED ORAL DAILY PRN
Status: CANCELLED | OUTPATIENT
Start: 2024-02-21

## 2024-02-21 RX ORDER — ACETAMINOPHEN 325 MG/1
650 TABLET ORAL EVERY 4 HOURS PRN
Status: DISCONTINUED | OUTPATIENT
Start: 2024-02-21 | End: 2024-02-22 | Stop reason: HOSPADM

## 2024-02-21 RX ORDER — SODIUM CHLORIDE 0.9 % (FLUSH) 0.9 %
5-40 SYRINGE (ML) INJECTION PRN
Status: DISCONTINUED | OUTPATIENT
Start: 2024-02-21 | End: 2024-02-22 | Stop reason: HOSPADM

## 2024-02-21 RX ORDER — SODIUM CHLORIDE 0.9 % (FLUSH) 0.9 %
5-40 SYRINGE (ML) INJECTION PRN
Status: CANCELLED | OUTPATIENT
Start: 2024-02-21

## 2024-02-21 RX ORDER — MORPHINE SULFATE 2 MG/ML
2 INJECTION, SOLUTION INTRAMUSCULAR; INTRAVENOUS
Status: DISCONTINUED | OUTPATIENT
Start: 2024-02-21 | End: 2024-02-22 | Stop reason: HOSPADM

## 2024-02-21 RX ORDER — IODIXANOL 270 MG/ML
200 INJECTION, SOLUTION INTRAVASCULAR
Status: COMPLETED | OUTPATIENT
Start: 2024-02-21 | End: 2024-02-21

## 2024-02-21 RX ORDER — SODIUM CHLORIDE 9 MG/ML
INJECTION, SOLUTION INTRAVENOUS PRN
Status: CANCELLED | OUTPATIENT
Start: 2024-02-21

## 2024-02-21 RX ORDER — SODIUM CHLORIDE 9 MG/ML
INJECTION, SOLUTION INTRAVENOUS PRN
Status: DISCONTINUED | OUTPATIENT
Start: 2024-02-21 | End: 2024-02-22 | Stop reason: HOSPADM

## 2024-02-21 RX ORDER — MORPHINE SULFATE 2 MG/ML
4 INJECTION, SOLUTION INTRAMUSCULAR; INTRAVENOUS
Status: CANCELLED | OUTPATIENT
Start: 2024-02-21

## 2024-02-21 RX ORDER — MORPHINE SULFATE 4 MG/ML
4 INJECTION INTRAVENOUS
Status: DISCONTINUED | OUTPATIENT
Start: 2024-02-21 | End: 2024-02-22 | Stop reason: HOSPADM

## 2024-02-21 RX ORDER — SODIUM CHLORIDE 0.9 % (FLUSH) 0.9 %
5-40 SYRINGE (ML) INJECTION EVERY 12 HOURS SCHEDULED
Status: CANCELLED | OUTPATIENT
Start: 2024-02-21

## 2024-02-21 RX ORDER — SODIUM CHLORIDE 0.9 % (FLUSH) 0.9 %
5-40 SYRINGE (ML) INJECTION EVERY 12 HOURS SCHEDULED
Status: DISCONTINUED | OUTPATIENT
Start: 2024-02-21 | End: 2024-02-22 | Stop reason: HOSPADM

## 2024-02-21 RX ORDER — ACETAMINOPHEN 325 MG/1
650 TABLET ORAL EVERY 4 HOURS PRN
Status: CANCELLED | OUTPATIENT
Start: 2024-02-21

## 2024-02-21 RX ADMIN — CLOPIDOGREL BISULFATE 75 MG: 75 TABLET ORAL at 08:43

## 2024-02-21 RX ADMIN — ROSUVASTATIN CALCIUM 40 MG: 20 TABLET, FILM COATED ORAL at 21:09

## 2024-02-21 RX ADMIN — ASPIRIN 81 MG 324 MG: 81 TABLET ORAL at 08:43

## 2024-02-21 RX ADMIN — SODIUM CHLORIDE, PRESERVATIVE FREE 10 ML: 5 INJECTION INTRAVENOUS at 08:45

## 2024-02-21 RX ADMIN — IODIXANOL 200 ML: 270 INJECTION, SOLUTION INTRAVASCULAR at 13:54

## 2024-02-21 ASSESSMENT — PAIN SCALES - GENERAL
PAINLEVEL_OUTOF10: 0

## 2024-02-21 NOTE — PROCEDURES
femoral nerve injury, retroperitoneal hemorrhage, hemorrhagic shock, infection, device failure, anaphylaxis, renal failure, limb loss, death, radiation effects (hair loss, cataracts, radiation burns, tumors, dementia), arterial dissection, arterial pseudoaneurysms and A-V fistula were discussed. The advantages and disadvantages of alternative procedures were presented. An opportunity to state any questions or concerns was given and these were then addressed. Following this process, it was requested that we proceed with the proposed intervention and this was expressed in the form of a written consent.      DETAILS OF THE PROCEDURE: The patient was brought to the neuroangiography suite where the groin was shaved, prepped and draped in usual sterile fashion. The right common femoral artery was accessed percutaneously with local anesthetic using a single wall puncture technique. A 6-Cymraes Shuttle sheath was placed at the puncture site prior to arterial catheterization. A 5-Cymraes Select catheter was advanced over a Glidewire into the aortic arch under the fluoroscopic guidance and used to selectively catheterize the right common carotid. The vessel origin was visualized fluoroscopically by injection of contrast prior to selective catheterization. A biplane diagnostic cerebral angiogram was acquired. After reviewing the images, the Select catheter was removed.  We then prepared for the stenting procedure.     ANGIOPLASTY AND STENTING, RIGHT CAROTID:The devices above were prepared on the back table.  The distal protection device was navigated under roadmap guidance to the distal internal carotid at the proximal end of the cervical loop in the artery and deployed. This was done with some difficulty due to the severity of stenosis and the loop, so I left it as far distal as possible to allow room for the balloon and stent.  I then advanced the balloon to the lesion under roadmap and confirmed position by angiography.  I

## 2024-02-21 NOTE — PLAN OF CARE
Problem: Discharge Planning  Goal: Discharge to home or other facility with appropriate resources  2/21/2024 1029 by Arlene Hurley RN  Outcome: Progressing  2/20/2024 2048 by Krupa Busby RN  Outcome: Progressing     Problem: Neurosensory - Adult  Goal: Achieves stable or improved neurological status  2/21/2024 1029 by Arlene Hurley RN  Outcome: Progressing  2/20/2024 2048 by Krupa Busby RN  Outcome: Progressing  Goal: Achieves maximal functionality and self care  2/21/2024 1029 by Arlene Hurley RN  Outcome: Progressing  2/20/2024 2048 by Krupa Busby RN  Outcome: Progressing     Problem: Safety - Adult  Goal: Free from fall injury  2/21/2024 1029 by Arlene Hurley RN  Outcome: Progressing  2/20/2024 2048 by Krupa Busby RN  Outcome: Progressing  Flowsheets (Taken 2/20/2024 2043)  Free From Fall Injury: Instruct family/caregiver on patient safety     Problem: ABCDS Injury Assessment  Goal: Absence of physical injury  2/21/2024 1029 by Arlene Hurley RN  Outcome: Progressing  2/20/2024 2048 by Krupa Busby RN  Outcome: Progressing  Flowsheets (Taken 2/20/2024 2043)  Absence of Physical Injury: Implement safety measures based on patient assessment     Problem: Pain  Goal: Verbalizes/displays adequate comfort level or baseline comfort level  2/21/2024 1029 by Arlene Hurley RN  Outcome: Progressing  2/20/2024 2048 by Krupa Busby RN  Outcome: Progressing

## 2024-02-21 NOTE — PLAN OF CARE
No complaints at time of exam.  Patient to be made NPO at midnight given planned angiography tomorrow with possible VALE stent    PHYSICAL EXAM:  Vitals:    02/20/24 1400 02/20/24 1522 02/20/24 2000 02/21/24 0000   BP:  (!) 146/83 (!) 166/100 (!) 141/82   Pulse: 70 73 67 66   Resp:  16 16    Temp:  97.6 °F (36.4 °C) 98 °F (36.7 °C) 98.2 °F (36.8 °C)   TempSrc:  Oral Oral Oral   SpO2:  100% 100% 99%   Weight:       Height:             General: Alert, no distress, well-nourished  Neurologic  Mental status:   orientation to person, place, time, situation   Attention intact as able to attend well to the exam     Language fluent in conversation   Comprehension intact; follows simple commands    Cranial nerves:   CN2: Visual fields full w/o extinction on confrontational testing   CN 3,4,6: Pupils equal and reactive to light, extraocular muscles intact  CN5: Facial sensation symmetric   CN7: Face symmetric  CN8: Hearing symmetric to spoken voice  CN9: Palate elevated symmetrically  CN11: Traps full strength on shoulder shrug  CN12: Tongue midline with protrusion    Motor Exam:  5/5 strength throughout all four extremities.       Sensory: light touch intact and symmetric in all 4 extremities.  No sensory extinction on bilateral simultaneous stimulation  Cerebellar/coordination: finger nose finger normal without ataxia  Tone: normal in all 4 extremities  Gait: Deferred for safety      OTHER SYSTEMS:  Cardiovascular: Warm, appears well perfused   Respiratory: Easy, non-labored respiratory pattern   Abdominal: Abdomen is without distention   Extremities: Upper and lower extremities are atraumatic in appearance without deformity. No swelling or erythema.

## 2024-02-21 NOTE — CARE COORDINATION
CM following for discharge planning. Pt to have right ICA stenting today with Neurosurgery.   Pt is from home Independent.     Isha Dennison RN, BSN, CM  Case Management Department  [phone]

## 2024-02-21 NOTE — PLAN OF CARE
Problem: Discharge Planning  Goal: Discharge to home or other facility with appropriate resources  2/20/2024 2048 by Krupa Busby, RN  Outcome: Progressing     Problem: Neurosensory - Adult  Goal: Achieves stable or improved neurological status  2/20/2024 2048 by Krupa Busby, RN  Outcome: Progressing     Problem: Neurosensory - Adult  Goal: Achieves maximal functionality and self care  2/20/2024 2048 by Krupa Busby, RN  Outcome: Progressing     Problem: Safety - Adult  Goal: Free from fall injury  2/20/2024 2048 by Krupa Busby, RN  Outcome: Progressing  Flowsheets (Taken 2/20/2024 2043)  Free From Fall Injury: Instruct family/caregiver on patient safety     Problem: ABCDS Injury Assessment  Goal: Absence of physical injury  2/20/2024 2048 by Krupa Busby, RN  Outcome: Progressing  Flowsheets (Taken 2/20/2024 2043)  Absence of Physical Injury: Implement safety measures based on patient assessment     Problem: Pain  Goal: Verbalizes/displays adequate comfort level or baseline comfort level  2/20/2024 2048 by Krupa Busby, RN  Outcome: Progressing

## 2024-02-21 NOTE — CONSULTS
NEUROSURGERY CONSULTATION NOTE    Admitting Physician:   Primary Care Physician: Romeo Ritchie APRN - NP    Chief Complaint: Left hand weakness, transient    HPI: Patient is a 58 y.o. male w/ PMH of HTN, HLD, lung CA s/p lobectomy who was admitted 2/15/24 with acute onset left hand weakness and numbness and speech difficulty.  Occurred around 7 am that morning, dropped a pencil out of his left hand and was unable to pick it back up with that hand. He attempted to call out for help but could not speak. He did notice left hand tingling at that time as well. Initial /94, NIHSS 2 for RIGHT nasolabial fold flattening and LEFT hemisensory deficit. CT head negative for hemorrhage or acute abnormality. CTA head and neck with R ICA occlusion and high-grade L ICA stenosis with soft plaque. Patient was started on neuro protocol heparin gtt and transferred to [facility] for stroke care and possible L ICA neurovascular intervention. While here MRI showed small embolic strokes on right    He was continued on heparin for 5 days, then repeat CTA was done with showed recanalization of his VALE with residual string sign.     ROS:   GENERAL:  Denies fever or recent illness. Denies weight changes   EYES:  Denies vision change or diplopia  EARS:  Denies hearing loss  CARDIAC:  Denies chest pain  RESPIRATORY:  Denies shortness of breath  SKIN:  Denies rash or lesions   HEM:  Denies excessive bruising  PSYCH:  Denies anxiety or depression  NEURO: +weakness, +sensory deficit, +speech changes   :  Denies urinary difficulty  GI: Denies nausea, vomiting, diarrhea or constipation  MUSCULOSKELETAL:  No arthralgias    PMHx:No past medical history on file.     SURGHx:No past surgical history on file.     FAMHx: No family history on file.    SOCHx:   Social History     Tobacco Use    Smoking status: Some Days    Smokeless tobacco: Never   Substance Use Topics    Alcohol use: Yes     Comment: social       ALLERGIES:Atorvastatin and Ibuprofen

## 2024-02-22 VITALS
HEIGHT: 74 IN | SYSTOLIC BLOOD PRESSURE: 153 MMHG | OXYGEN SATURATION: 98 % | RESPIRATION RATE: 15 BRPM | DIASTOLIC BLOOD PRESSURE: 102 MMHG | WEIGHT: 215.17 LBS | BODY MASS INDEX: 27.61 KG/M2 | HEART RATE: 80 BPM | TEMPERATURE: 98.4 F

## 2024-02-22 LAB
ANION GAP SERPL CALCULATED.3IONS-SCNC: 12 MMOL/L (ref 3–16)
BASOPHILS # BLD: 0.1 K/UL (ref 0–0.2)
BASOPHILS NFR BLD: 0.5 %
BUN SERPL-MCNC: 19 MG/DL (ref 7–20)
CALCIUM SERPL-MCNC: 9.1 MG/DL (ref 8.3–10.6)
CHLORIDE SERPL-SCNC: 103 MMOL/L (ref 99–110)
CO2 SERPL-SCNC: 23 MMOL/L (ref 21–32)
CREAT SERPL-MCNC: 1.4 MG/DL (ref 0.9–1.3)
DEPRECATED RDW RBC AUTO: 14.5 % (ref 12.4–15.4)
EOSINOPHIL # BLD: 0.2 K/UL (ref 0–0.6)
EOSINOPHIL NFR BLD: 1.5 %
GFR SERPLBLD CREATININE-BSD FMLA CKD-EPI: 58 ML/MIN/{1.73_M2}
GLUCOSE SERPL-MCNC: 98 MG/DL (ref 70–99)
HCT VFR BLD AUTO: 43.1 % (ref 40.5–52.5)
HGB BLD-MCNC: 14.1 G/DL (ref 13.5–17.5)
LYMPHOCYTES # BLD: 2.1 K/UL (ref 1–5.1)
LYMPHOCYTES NFR BLD: 20.1 %
MAGNESIUM SERPL-MCNC: 2.1 MG/DL (ref 1.8–2.4)
MCH RBC QN AUTO: 27.1 PG (ref 26–34)
MCHC RBC AUTO-ENTMCNC: 32.8 G/DL (ref 31–36)
MCV RBC AUTO: 82.8 FL (ref 80–100)
MONOCYTES # BLD: 1 K/UL (ref 0–1.3)
MONOCYTES NFR BLD: 9.6 %
NEUTROPHILS # BLD: 7.3 K/UL (ref 1.7–7.7)
NEUTROPHILS NFR BLD: 68.3 %
PLATELET # BLD AUTO: 161 K/UL (ref 135–450)
PMV BLD AUTO: 9.6 FL (ref 5–10.5)
POTASSIUM SERPL-SCNC: 4.3 MMOL/L (ref 3.5–5.1)
RBC # BLD AUTO: 5.21 M/UL (ref 4.2–5.9)
SODIUM SERPL-SCNC: 138 MMOL/L (ref 136–145)
WBC # BLD AUTO: 10.6 K/UL (ref 4–11)

## 2024-02-22 PROCEDURE — 6370000000 HC RX 637 (ALT 250 FOR IP): Performed by: NURSE PRACTITIONER

## 2024-02-22 PROCEDURE — 36415 COLL VENOUS BLD VENIPUNCTURE: CPT

## 2024-02-22 PROCEDURE — 6360000002 HC RX W HCPCS: Performed by: NEUROLOGICAL SURGERY

## 2024-02-22 PROCEDURE — 2580000003 HC RX 258: Performed by: NEUROLOGICAL SURGERY

## 2024-02-22 PROCEDURE — 2580000003 HC RX 258

## 2024-02-22 PROCEDURE — 80048 BASIC METABOLIC PNL TOTAL CA: CPT

## 2024-02-22 PROCEDURE — 85025 COMPLETE CBC W/AUTO DIFF WBC: CPT

## 2024-02-22 PROCEDURE — 6370000000 HC RX 637 (ALT 250 FOR IP): Performed by: STUDENT IN AN ORGANIZED HEALTH CARE EDUCATION/TRAINING PROGRAM

## 2024-02-22 PROCEDURE — 83735 ASSAY OF MAGNESIUM: CPT

## 2024-02-22 RX ORDER — CLOPIDOGREL BISULFATE 75 MG/1
75 TABLET ORAL DAILY
Qty: 30 TABLET | Refills: 3 | Status: SHIPPED | OUTPATIENT
Start: 2024-02-23

## 2024-02-22 RX ORDER — OXYCODONE HYDROCHLORIDE 5 MG/1
5 TABLET ORAL EVERY 4 HOURS PRN
Qty: 18 TABLET | Refills: 0 | Status: SHIPPED | OUTPATIENT
Start: 2024-02-22 | End: 2024-02-25

## 2024-02-22 RX ORDER — ASPIRIN 325 MG
325 TABLET ORAL DAILY
Qty: 30 TABLET | Refills: 3 | Status: SHIPPED | OUTPATIENT
Start: 2024-02-22

## 2024-02-22 RX ORDER — ROSUVASTATIN CALCIUM 40 MG/1
40 TABLET, COATED ORAL NIGHTLY
Qty: 30 TABLET | Refills: 3 | Status: SHIPPED | OUTPATIENT
Start: 2024-02-22

## 2024-02-22 RX ADMIN — SODIUM CHLORIDE, PRESERVATIVE FREE 10 ML: 5 INJECTION INTRAVENOUS at 08:48

## 2024-02-22 RX ADMIN — MORPHINE SULFATE 4 MG: 4 INJECTION INTRAVENOUS at 06:10

## 2024-02-22 RX ADMIN — ASPIRIN 81 MG 324 MG: 81 TABLET ORAL at 08:49

## 2024-02-22 RX ADMIN — CLOPIDOGREL BISULFATE 75 MG: 75 TABLET ORAL at 08:49

## 2024-02-22 RX ADMIN — PANTOPRAZOLE SODIUM 40 MG: 40 TABLET, DELAYED RELEASE ORAL at 06:10

## 2024-02-22 ASSESSMENT — PAIN DESCRIPTION - FREQUENCY: FREQUENCY: CONTINUOUS

## 2024-02-22 ASSESSMENT — PAIN DESCRIPTION - PAIN TYPE: TYPE: SURGICAL PAIN

## 2024-02-22 ASSESSMENT — PAIN DESCRIPTION - LOCATION: LOCATION: LEG

## 2024-02-22 ASSESSMENT — PAIN SCALES - GENERAL
PAINLEVEL_OUTOF10: 0
PAINLEVEL_OUTOF10: 0
PAINLEVEL_OUTOF10: 9
PAINLEVEL_OUTOF10: 0
PAINLEVEL_OUTOF10: 0

## 2024-02-22 ASSESSMENT — PAIN DESCRIPTION - DESCRIPTORS: DESCRIPTORS: ACHING;DISCOMFORT;THROBBING

## 2024-02-22 ASSESSMENT — PAIN - FUNCTIONAL ASSESSMENT: PAIN_FUNCTIONAL_ASSESSMENT: PREVENTS OR INTERFERES SOME ACTIVE ACTIVITIES AND ADLS

## 2024-02-22 ASSESSMENT — PAIN DESCRIPTION - ORIENTATION: ORIENTATION: RIGHT

## 2024-02-22 NOTE — PLAN OF CARE
Patient s/p R ICA stent. Exam stable. R groin site shows small amount of oozing. No bruising/hematoma. Distal pulses intact.  No changes to plan of care at this time. Please call with any exam changes or questions.    VIPUL Whitfield - CNP   Neurology & Neurocritical Care   2/21/2024 8:27 PM    ICU Patients:   Neurocritical Care Line: [phone]  [facility]: [facility]

## 2024-02-22 NOTE — PLAN OF CARE
Problem: Discharge Planning  Goal: Discharge to home or other facility with appropriate resources  Outcome: Progressing     Problem: Neurosensory - Adult  Goal: Achieves stable or improved neurological status  Outcome: Progressing     Problem: Neurosensory - Adult  Goal: Achieves maximal functionality and self care  Outcome: Progressing     Problem: Safety - Adult  Goal: Free from fall injury  Outcome: Progressing  Flowsheets (Taken 2/22/2024 0123)  Free From Fall Injury: Instruct family/caregiver on patient safety     Problem: ABCDS Injury Assessment  Goal: Absence of physical injury  Outcome: Progressing  Flowsheets (Taken 2/22/2024 0123)  Absence of Physical Injury: Implement safety measures based on patient assessment     Problem: Pain  Goal: Verbalizes/displays adequate comfort level or baseline comfort level  Outcome: Progressing

## 2024-02-22 NOTE — PLAN OF CARE
POD 1 Right ICA stent placement with Dr. Martel. Doing very well and states he feels great. No focal deficits on exam. Headaches are gone. Planning to be discharged today on Aspirin and Plavix with follow up with Dr. Martel in 4 week outpatient.    We will sign off at this time. Please do not hesitate to reach out with any questions.    Jamie MATTHEW - NP  Neurology and Neuro critical care  [phone]

## 2024-02-22 NOTE — PLAN OF CARE
Problem: Discharge Planning  Goal: Discharge to home or other facility with appropriate resources  2/22/2024 1147 by Sahara Espinal RN  Outcome: Completed  Flowsheets (Taken 2/22/2024 0800)  Discharge to home or other facility with appropriate resources: Identify barriers to discharge with patient and caregiver  2/22/2024 0133 by Krupa Busby RN  Outcome: Progressing     Problem: Neurosensory - Adult  Goal: Achieves stable or improved neurological status  2/22/2024 1147 by Sahara Espinal RN  Outcome: Completed  Flowsheets (Taken 2/22/2024 0800)  Achieves stable or improved neurological status: Assess for and report changes in neurological status  2/22/2024 0133 by Krupa Busby RN  Outcome: Progressing  Goal: Achieves maximal functionality and self care  2/22/2024 1147 by Sahara Espinal RN  Outcome: Completed  Flowsheets (Taken 2/22/2024 0800)  Achieves maximal functionality and self care: Encourage and assist patient to increase activity and self care with guidance from physical therapy/occupational therapy  2/22/2024 0133 by Krupa Busby RN  Outcome: Progressing     Problem: Safety - Adult  Goal: Free from fall injury  2/22/2024 1147 by Sahara Espinal RN  Outcome: Completed  Flowsheets (Taken 2/22/2024 0736)  Free From Fall Injury: Instruct family/caregiver on patient safety  2/22/2024 0133 by Krupa Busby RN  Outcome: Progressing  Flowsheets (Taken 2/22/2024 0123)  Free From Fall Injury: Instruct family/caregiver on patient safety     Problem: ABCDS Injury Assessment  Goal: Absence of physical injury  2/22/2024 1147 by Sahara Espinal RN  Outcome: Completed  Flowsheets (Taken 2/22/2024 0736)  Absence of Physical Injury: Implement safety measures based on patient assessment  2/22/2024 0133 by Krupa Busby RN  Outcome: Progressing  Flowsheets (Taken 2/22/2024 0123)  Absence of Physical Injury: Implement safety measures based on patient assessment     Problem: Pain  Goal: Verbalizes/displays

## 2024-02-22 NOTE — PROGRESS NOTES
NEUROCRITICAL CARE PROGRESS NOTE       Patient Name: Juan Cantor YOB: 1965   Sex: Male Age: 58 yrs     CC / Reason for Consult: R ICA occlusion / L ICA stenosis    Subjective / ROS / Updates over last 24 hours:  Doing well  MRI shows very small R sided stroke  No new complaints     ASSESSMENT & RECOMMENDATIONS   IMPRESSION:  Patient is a 57 y/o M who presents with L sided numbness and L hand weakness found to have R ICA occlussion and severe L ICA stenosis. Transferred to [facility] for management.   Heparin gtt started yesterday   Neurovascular team more concerned for L ICA thrombus and will continue w/ heparin gtt for 5 days & repeat imaging, further plan pending imaging findings      RECOMMENDATIONS / PLAN:  R ICA Oclussion / L ICA severe stenosis   Medications:  -High-intensity statin (as able - has allergy)   -Start heparin gtt (Neuro protocol - No bolus)   Heparin x 5 days  -SCDs for DVT prophylaxis     Imaging / Labs:  F/u CTA in 5 days    Consults / Nursing Care  -HOB elevated to help prevent aspiration  -Q4H Neurologic Exams & Vitals  -NIHSS Qshift  -Telemetry   -PT/OT/SLP  -Blood Pressure Management              - -220  -Okay to transfer to floor from NCC standpoint   -We will follow. Please call with questions.      Management and plan discussed with:  Nursing staff  Dr. Oliver Manzanares, APRN - [facility]   NEUROCRITICAL CARE  2/16/2024 9:10 AM  Perfect[facility]erve: [facility] NEUROCRITICAL CARE    HISTORY     Allergies Allergies   Allergen Reactions    Atorvastatin Myalgia    Ibuprofen      Kidney damage       Diet Diet NPO   Isolation No active isolations     LABS   Metabolic Panel Recent Labs     02/15/24  0835 02/16/24  0300    138   K 4.0 4.1    103   CO2 23 23   BUN 23* 22*   CREATININE 1.4* 1.3   GLUCOSE 115* 110*   CALCIUM 9.0 8.9   MG  --  2.20      CBC / Coags Recent Labs     02/15/24  0835 02/16/24  0300   WBC 7.7 8.5   RBC 5.19 5.46   HGB 14.0 14.8   HCT 42.9 
         NEUROCRITICAL CARE PROGRESS NOTE       Patient Name: Juan Cantor YOB: 1965   Sex: Male Age: 58 yrs     CC / Reason for Consult: R ICA occlusion / L ICA stenosis    Subjective / ROS / Updates over last 24 hours:  Doing well  On the phone with family on my visit  No new complaints   Anti Xa therapeutic as of last night at 10 PM. 0.47 this AM.    ASSESSMENT & RECOMMENDATIONS   IMPRESSION:  Juan Cantor is a 57 y/o man who presented with left sided numbness and left hand weakness found to have right ICA occlussion and severe left ICA stenosis.   Heparin gtt started 2/15. Given suspicion for associated thrombus, neurosurgery recommended heparin gtt x 5 days with repeat CT-A & further planning at that point.     RECOMMENDATIONS / PLAN:  - Continue heparin gtt with no boluses, anti Xa goal 0.3-0.5. Anti Xa 0.47 today.  - F/U CT-A in 5 days. Neurovascular surgical plan TBD at that time.  - Avoid hypotension  - HOB elevated to help prevent aspiration  - Q4H Neurologic Exams & Vitals  - NIHSS Qshift  - Telemetry - notify neurology of any afib  - PT/OT/SLP       - Okay to transfer to floor from [facility] standpoint  - For any acute changes please call [phone] immediately & anticipate CT-A.     Management and plan discussed with:  VIPUL Turner - [facility]   NEUROCRITICAL CARE  2/17/2024 10:30 AM  [facility]: [facility] NEUROCRITICAL CARE    HISTORY     Allergies Allergies   Allergen Reactions    Atorvastatin Myalgia    Ibuprofen      Kidney damage       Diet ADULT DIET; Regular; Low Fat/Low Chol/High Fiber/JULIA   Isolation No active isolations     LABS   Metabolic Panel Recent Labs     02/15/24  0835 02/16/24  0300 02/17/24  0521    138 140   K 4.0 4.1 4.2    103 106   CO2 23 23 21   BUN 23* 22* 16   CREATININE 1.4* 1.3 1.4*   GLUCOSE 115* 110* 113*   CALCIUM 9.0 8.9 9.0   MG  --  2.20 2.20        CBC / Coags Recent Labs     02/15/24  0835 02/16/24  0300 02/17/24  0521   WBC 7.7 
         NEUROCRITICAL CARE PROGRESS NOTE       Patient Name: Juan Cantor YOB: 1965   Sex: Male Age: 58 yrs     CC / Reason for Consult: R ICA occlusion / L ICA stenosis    Subjective / ROS / Updates over last 24 hours:  Doing well  On the phone with family on my visit  No new complaints   Anti Xa therapeutic this AM    ASSESSMENT & RECOMMENDATIONS   IMPRESSION:  Juan Cantor is a 57 y/o man who presented with left sided numbness and left hand weakness found to have right ICA occlussion and severe left ICA stenosis.   Heparin gtt started 2/15. Given suspicion for associated thrombus, neurosurgery recommended heparin gtt x 5 days with repeat CT-A & further planning at that point.     RECOMMENDATIONS / PLAN:  - Continue heparin gtt with no boluses, anti Xa goal 0.3-0.5. Anti Xa 0.56 today.  - F/U CT-A 2/20. Neurovascular surgical plan TBD at that time.  - Avoid hypotension  - HOB elevated to help prevent aspiration  - Q4H Neurologic Exams & Vitals  - NIHSS Qshift  - Telemetry - notify neurology of any afib  - PT/OT/SLP          Management and plan discussed with:  Dr. Oshea and ICU nurses    VIPUL Dumont - CNP   NEUROCRITICAL CARE  2/19/2024 11:08 AM  [facility]: [facility] NEUROCRITICAL CARE    HISTORY     Allergies Allergies   Allergen Reactions    Atorvastatin Myalgia    Ibuprofen      Kidney damage       Diet ADULT DIET; Regular; Low Fat/Low Chol/High Fiber/JULIA   Isolation No active isolations     LABS   Metabolic Panel Recent Labs     02/17/24  0521 02/18/24  0602 02/19/24  0506    139 140   K 4.2 4.3 3.9    105 105   CO2 21 20* 21   BUN 16 15 15   CREATININE 1.4* 1.4* 1.4*   GLUCOSE 113* 111* 106*   CALCIUM 9.0 9.1 9.6   MG 2.20 2.10 2.00        CBC / Coags Recent Labs     02/17/24  0521 02/18/24  0602 02/19/24  0506   WBC 8.7 9.0 9.4   RBC 5.44 5.65 5.45   HGB 15.0 15.1 14.8   HCT 45.8 48.0 45.9    174 188        Other Lab Results   Component Value Date/Time    
     ICU Progress Note    Admit Date: 2/15/2024  Day: 3  Vent Day: NA  IV Access:Peripheral  IV Fluids:None  Vasopressors:None                Antibiotics: NA  Diet: ADULT DIET; Regular; Low Fat/Low Chol/High Fiber/JULIA    CC: weakness, aphasia    Interval history: NAEO. No change in symptoms this AM, patient remains anxious regarding situation.    HPI:     Pt is a 59 yo male with past medical history HLD, HTN, lung cancer s/p resection R middle lobectomy in 2018, CKD(?) presenting with aphasia and weakness. Patient was at home and dropped a pencil on the ground and then found that he was able to initially grab it but then not keep it in his grasp and then found he was unable to speak fluently as well, partner reports he was garbling his speech. Patient then presented to [facility] ED d/t these symptoms.      At [facility] patient had improved speech initially though he did have a repeat episode of garbled speech for a short amount of time on arrival to [facility], CTA revealed complete R carotid occlusion and severe stenosis of L carotid. Patient then transferred to [facility] ICU for further monitoring.      On arrival to [facility], aphasia improved, still experiencing limited L sided numbness but strength much better. Will continue to monitor.    Medications:     Scheduled Meds:   sodium chloride flush  5-40 mL IntraVENous 2 times per day    rosuvastatin  40 mg Oral Nightly     Continuous Infusions:   sodium chloride 75 mL/hr at 02/16/24 1256    heparin (PORCINE) Infusion 17 Units/kg/hr (02/16/24 2238)    sodium chloride       PRN Meds:sodium chloride flush, sodium chloride, ondansetron **OR** ondansetron, polyethylene glycol, acetaminophen **OR** acetaminophen, perflutren lipid microspheres    Objective:   Vitals:   T-max:  Patient Vitals for the past 8 hrs:   BP Temp Temp src Pulse Resp SpO2   02/17/24 0600 121/65 -- -- 63 13 --   02/17/24 0500 119/79 -- -- 69 13 --   02/17/24 0400 133/88 98 °F (36.7 °C) Temporal 67 22 98 % 
    NEUROSURGERY PROGRESS NOTE    MATTEO BURNS   [id number]   1965 2/18/2024    Interval History:  NAEO, anti-XA 0.49. transient vision loss in R eye for 1 minute this morning, back to baseline   Hospital Day #3    Subjective: resting in bed, significant other at bedside. Didn't sleep well overnight due to monitor alarms on unit.    Objective:  BP (!) 140/90   Pulse 66   Temp 98 °F (36.7 °C) (Temporal)   Resp 14   Ht 1.867 m (6' 1.5\")   Wt 97.3 kg (214 lb 8.1 oz)   SpO2 99%   BMI 27.92 kg/m²     Labs:  Recent Labs     02/15/24  0835 02/16/24  0300 02/17/24  0521    138 140    103 106   CO2 23 23 21   BUN 23* 22* 16   CREATININE 1.4* 1.3 1.4*   GLUCOSE 115* 110* 113*       Recent Labs     02/15/24  0835 02/16/24  0300 02/17/24  0521 02/18/24  0602   WBC 7.7 8.5 8.7 9.0   RBC 5.19 5.46 5.44 5.65   INR 0.92  --   --   --        CT HEAD WO CONTRAST   Final Result 2/16/2024      No acute intracranial abnormality.              Neurologic Exam:  GCS:  4 - Opens eyes on own  5 - Alert and oriented  6 - Follows simple motor commands    Mental Status: Awake, alert, oriented x 4   Language: No aphasia or dysarthria noted  Sensation: Intact to all extremities to light touch  Coordination: Intact    Cranial Nerves:  II: Visual acuity not tested, visual fields intact, denies new visual changes / diplopia  III, IV, VI: PERRL, 3 mm bilaterally, EOMI, no nystagmus noted  V: Facial sensation intact bilaterally to touch  VII: Face symmetric  VIII: Hearing intact bilaterally to spoken voice  IX: Palate movement equal bilaterally  XI: Shoulder shrug equal bilaterally  XII: Tongue midline    Musculoskeletal:   Gait: Not tested   Tone: normal   Motor strength:    Right  Left    Right  Left    Deltoid  5 5  Hip Flex  5 5   Biceps  5 5  Knee Extensors  5 5   Triceps  5 5  Knee Flexors  5 5   Wrist Ext  5 5  Ankle Dorsiflex.  5 5   Wrist Flex  5 5  Ankle Plantarflex.  5 5   Handgrip  5 5  Ext Joey Longus  5 5 
  Current NIHSS 0    Nursing Core Measures for Stroke:   []   Education template documentation (STROKE/TIA). Please select only risk factors that are applicable to patient when selecting risk factors.  []   Care Plan template documentation (Physiologic Instability - Neurosensory). Selecting this will add care plan rows to the flowsheet under the Neuro section of Head to Toe.  []   Verified Swallow Screen completed prior to PO intake of food, drink, medications>          Please verify correct medication route prior to administration for intubated patients, patients who can not swallow or have alternative routes of intake (NG, OG, AR), etc  []   VTE Prophylaxis: SCDs ordered/addressed; SCDs: N/A Heparin           (As a reminder, ASA, Plavix, and TPA/TNK are not VTE prophylaxis.)    Reviewed the Following Education with Patient and/or Family:   - Personalized risk factors for patient, along with changes, modifications that will help prevent stroke.  - Signs and Symptoms of Stroke: (Facial droop, weakness/numbness especially on one side, speech difficulty, sudden confusion, sudden loss of vision, sudden severe headache, sudden loss of balance or having difficulty walking, syncope, or seizure)  - How to activate EMS (911)   - Importance of Follow Up Appointments at Discharge   - Importance of Compliance with Medications Prescribed at Discharge  - Available community resources and stroke advocacy groups if needed    Patient and/or family member: verbalized understanding.     Stroke Education booklet given to patient/family (or verified, if given already), which reviews above information. yes         Electronically signed by Windy Murillo RN on 2/20/2024 at 8:35 AM 0  
  Hospital Medicine Progress Note      Date of Admission: 2/15/2024  Hospital Day: 2    Chief Admission Complaint: Expressive aphasia, left upper extremity weakness     Subjective: Patient was seen and evaluated at bedside.  Reported having poor sleep overnight.  Complained of having some speech difficulty.  Did not have any other active complaints.  Did not have any events overnight.    Presenting Admission History:       58-year-old gentleman with a past medical history of dyslipidemia, hypertension, GERD, lung cancer status post lobectomy who presented to the ER for evaluation of expressive aphasia and weakness of the left hand and leg with ataxia.  Onset of symptoms was at 7:30 AM.  Symptoms spontaneously improved.  CT head unremarkable, CT angiogram of the head and neck showed complete occlusion of the right carotid artery, 80 to 90% stenosis of the left proximal ICA.  ER discussed the case with on-call neurosurgeon Dr. Martel who recommended admission to neuro ICU heparin drip without bolus and evaluation of endarterectomy      Assessment/Plan:      Current Principal Problem:  Right carotid artery occlusion    TIA/acute stroke/left carotid stenosis 80 to 90%/complete right carotid occlusion  Dyslipidemia  Hypertension  History of lung cancer    Plan:  *CT of the head in the ER negative  CT angiogram of the head and neck showed complete right internal carotid artery occlusion, severe left internal carotid artery stenosis 80 to 90%  Patient transferred to the [facility] ICU per neurosurgery recommendations  MRI brain shows punctate diffuse signal abnormalities in the posterior lateral temporal cortex.  Tiny distal infarct  Check echo  ASCVD risk of 42% for cardiovascular event in 10 years  Patient now on high intensity statin  Neurosurgery recommends heparin drip for 5 days and repeat imaging  Further intervention pending imaging findings  Patient complained of word finding pauses again  Repeat CT head 
  Hospital Medicine Progress Note      Date of Admission: 2/15/2024  Hospital Day: 3    Chief Admission Complaint: Expressive aphasia, left upper extremity weakness     Subjective: Patient was seen and evaluated at bedside.  Did not have any active complaints.  Did not have any events overnight    Presenting Admission History:       58-year-old gentleman with a past medical history of dyslipidemia, hypertension, GERD, lung cancer status post lobectomy who presented to the ER for evaluation of expressive aphasia and weakness of the left hand and leg with ataxia.  Onset of symptoms was at 7:30 AM.  Symptoms spontaneously improved.  CT head unremarkable, CT angiogram of the head and neck showed complete occlusion of the right carotid artery, 80 to 90% stenosis of the left proximal ICA.  ER discussed the case with on-call neurosurgeon Dr. Martel who recommended admission to neuro ICU heparin drip without bolus and evaluation of endarterectomy      Assessment/Plan:      Current Principal Problem:  Right carotid artery occlusion    TIA/acute stroke/left carotid stenosis 80 to 90%/complete right carotid occlusion  Dyslipidemia  Hypertension  History of lung cancer    Plan:  *CT of the head in the ER negative  CT angiogram of the head and neck showed complete right internal carotid artery occlusion, severe left internal carotid artery stenosis 80 to 90%  Patient transferred to the [facility] ICU per neurosurgery recommendations  MRI brain shows punctate diffuse signal abnormalities in the posterior lateral temporal cortex.  Tiny distal infarct  Transthoracic echo shows an EF of 55%, grade 1 diastolic dysfunction  ASCVD risk of 42% for cardiovascular event in 10 years  Patient now on high intensity statin  Patient complained of word finding pauses again  Repeat CT head negative for acute pathology  Neurosurgery recommends heparin drip for 5 days and repeat imaging  Further intervention pending imaging findings  PT OT to 
  Hospital Medicine Progress Note      Date of Admission: 2/15/2024  Hospital Day: 4    Chief Admission Complaint: Expressive aphasia, left upper extremity weakness     Subjective: Patient was seen and evaluated at bedside.  Did not have any active complaints.  Reported transient vision loss in right eye for 1 minute this morning with subsequent spontaneous resolution    Presenting Admission History:       58-year-old gentleman with a past medical history of dyslipidemia, hypertension, GERD, lung cancer status post lobectomy who presented to the ER for evaluation of expressive aphasia and weakness of the left hand and leg with ataxia.  Onset of symptoms was at 7:30 AM.  Symptoms spontaneously improved.  CT head unremarkable, CT angiogram of the head and neck showed complete occlusion of the right carotid artery, 80 to 90% stenosis of the left proximal ICA.  ER discussed the case with on-call neurosurgeon Dr. Martel who recommended admission to neuro ICU heparin drip without bolus and evaluation of endarterectomy      Assessment/Plan:      Current Principal Problem:  Right carotid artery occlusion    TIA/acute stroke/left carotid stenosis 80 to 90%/complete right carotid occlusion  Dyslipidemia  Hypertension  History of lung cancer    Plan:  *CT of the head in the ER negative  CT angiogram of the head and neck showed complete right internal carotid artery occlusion, severe left internal carotid artery stenosis 80 to 90%  Patient transferred to the [facility] ICU per neurosurgery recommendations  MRI brain shows punctate diffuse signal abnormalities in the posterior lateral temporal cortex.  Tiny distal infarct  Transthoracic echo shows an EF of 55%, grade 1 diastolic dysfunction  ASCVD risk of 42% for cardiovascular event in 10 years  Patient now on high intensity statin  Patient complained of word finding pauses again  Repeat CT head negative for acute pathology  Neurosurgery recommends heparin drip for 5 days 
  Hospital Medicine Progress Note      Date of Admission: 2/15/2024  Hospital Day: 5    Chief Admission Complaint: Expressive aphasia, left upper extremity weakness     Subjective: Patient was seen and evaluated at bedside.  Did not have any active complaints.  Did not have any events overnight    Presenting Admission History:       58-year-old gentleman with a past medical history of dyslipidemia, hypertension, GERD, lung cancer status post lobectomy who presented to the ER for evaluation of expressive aphasia and weakness of the left hand and leg with ataxia.  Onset of symptoms was at 7:30 AM.  Symptoms spontaneously improved.  CT head unremarkable, CT angiogram of the head and neck showed complete occlusion of the right carotid artery, 80 to 90% stenosis of the left proximal ICA.  ER discussed the case with on-call neurosurgeon Dr. Martel who recommended admission to neuro ICU heparin drip without bolus and evaluation of endarterectomy      Assessment/Plan:      Current Principal Problem:  Right carotid artery occlusion    TIA/acute stroke/left carotid stenosis 80 to 90%/complete right carotid occlusion  Dyslipidemia  Hypertension  History of lung cancer    Plan:  *CT of the head in the ER negative  CT angiogram of the head and neck showed complete right internal carotid artery occlusion, severe left internal carotid artery stenosis 80 to 90%  Patient transferred to the [facility] ICU per neurosurgery recommendations  MRI brain shows punctate diffuse signal abnormalities in the posterior lateral temporal cortex.  Tiny distal infarct  Transthoracic echo shows an EF of 55%, grade 1 diastolic dysfunction  ASCVD risk of 42% for cardiovascular event in 10 years  Patient now on high intensity statin  Patient complained of word finding pauses again  Repeat CT head negative for acute pathology  Neurosurgery recommends heparin drip for 5 days and repeat imaging on Tuesday  Further intervention pending imaging 
4 Eyes Skin Assessment     NAME:  Juan Cantor  YOB: 1965  MEDICAL RECORD NUMBER:  [id number]    The patient is being assessed for  Admission    I agree that at least one RN has performed a thorough Head to Toe Skin Assessment on the patient. ALL assessment sites listed below have been assessed.      Areas assessed by both nurses:    Head, Face, Ears, Shoulders, Back, Chest, Arms, Elbows, Hands, Sacrum. Buttock, Coccyx, Ischium, and Legs. Feet and Heels        Does the Patient have a Wound? No noted wound(s)       Rashawn Prevention initiated by RN: No  Wound Care Orders initiated by RN: No    Pressure Injury (Stage 3,4, Unstageable, DTI, NWPT, and Complex wounds) if present, place Wound referral order by RN under : No    New Ostomies, if present place, Ostomy referral order under : No     Nurse 1 eSignature: Electronically signed by Guerda Yoo RN on 2/15/24 at 5:07 PM EST    **SHARE this note so that the co-signing nurse can place an eSignature**    Nurse 2 eSignature: Electronically signed by Shyanne Conway RN on 2/15/24 at 9:26 PM EST   
Anti Xa is 0.48. No change, per Mar. Will set next draw for 19:30.  
Called into patient room. Patient stated he lost vision in right eye for about a minute with no other deficits. Vision resolved by the time this RN walked into the room. No other deficits at this time. Neurosurgery NP Adriana notified.  
Clinical Pharmacy Progress Note  Medication History     Admit Date: 2/15/2024    List of of current medications patient is taking is complete. Home Medication list in EPIC updated to reflect changes noted below.    Source of information: patient interview at bedside    [facility]     Changes made to medication list:   Medications added:   Amlodipine  Losartan/HCTZ  Systane opth prn  Omeprazole  Other notes:   Atorvastatin - pt did fill this recently in Jan, but experienced myalgias.  Provider took him off this medication.  Added to allergy list as intolerance.      Current Outpatient Medications   Medication Instructions    albuterol (PROVENTIL) 5 mg, Nebulization, 4 TIMES DAILY PRN    albuterol sulfate HFA (PROAIR HFA) 108 (90 Base) MCG/ACT inhaler 2 puffs, Inhalation, EVERY 6 HOURS PRN    amLODIPine (NORVASC) 10 mg, Oral, DAILY    losartan-hydroCHLOROthiazide (HYZAAR) 100-12.5 MG per tablet 1 tablet, Oral, DAILY    omeprazole (PRILOSEC) 20 mg, Oral, DAILY    polyethyl glycol-propyl glycol 0.4-0.3 % (SYSTANE) 0.4-0.3 % ophthalmic solution 1 drop, Both Eyes, PRN       Please call with any questions.  Babita RussellD., BCPS   2/15/2024 1:37 PM  Wireless: [phone]        
Heparin level adjusted at shift handoff. Next anti xa is set for 1300.  
Nephrology Consult Note                                                                                                                                                                                                                                                                                                                                                               Office : [phone]     Fax :[phone]    Patient's Name: Juan Cantor      Reason for Consult:  AMAYA   Requesting Physician:  Romeo Ritchie APRN - NP  Chief Complaint:    Chief Complaint   Patient presents with    feels jittery and shaky       Assessment/Plan     # AMAYA     # HTN    # Anemia    # Acid- base/ Electrolyte imbalance     # CVA     Plan   - check Ur studies  - pt has AYAH +  - permissive HTN for now as pt has Ac CVA   - will need good BP control in future       Recommend to dose adjust all medications  based on renal functions  Maintain SBP> 90 mmHg   Daily weights   AVOID NSAIDs  Avoid Nephrotoxins  Monitor Intake/Output  Call if significant decrease in urine output      History of Present Ilness:    58 y.o. male w/ PMH of HTN, HLD, lung CA s/p lobectomy who presents with acute onset left hand weakness and numbness and speech difficulty.  Occurred around 7 am this morning, dropped a pencil out of his left hand and was unable to pick it back up with that hand. He attempted to call out for help but could not speak. He did notice left hand tingling at this time as well. Initial /94, NIHSS 2 for RIGHT nasolabial fold flattening and LEFT hemisensory deficit. CT head negative for hemorrhage or acute abnormality. CTA head and neck with R ICA occlusion and high-grade L ICA stenosis with soft plaque. Patient was started on neuro protocol heparin gtt and transferred to [facility] for stroke care and possible L ICA neurovascular intervention.       Interval hx     No past medical history on file.    No past surgical history on file.    No 
Nephrology Consult Note                                                                                                                                                                                                                                                                                                                                                               Office : [phone]     Fax :[phone]    Patient's Name: Juan Cantor      Reason for Consult:  AMAYA   Requesting Physician:  Romeo Ritchie APRN - MIMI  Chief Complaint:    Chief Complaint   Patient presents with    feels jittery and shaky       Assessment/Plan     # AMAYA     # HTN    # Anemia    # Acid- base/ Electrolyte imbalance     # CVA     Plan   - Ur studies - no proteinuria   - pt has AYAH +  - permissive HTN for now as pt has Ac CVA   - will need good BP control in future       Recommend to dose adjust all medications  based on renal functions  Maintain SBP> 90 mmHg   Daily weights   AVOID NSAIDs  Avoid Nephrotoxins  Monitor Intake/Output  Call if significant decrease in urine output      History of Present Ilness:    58 y.o. male w/ PMH of HTN, HLD, lung CA s/p lobectomy who presents with acute onset left hand weakness and numbness and speech difficulty.  Occurred around 7 am this morning, dropped a pencil out of his left hand and was unable to pick it back up with that hand. He attempted to call out for help but could not speak. He did notice left hand tingling at this time as well. Initial /94, NIHSS 2 for RIGHT nasolabial fold flattening and LEFT hemisensory deficit. CT head negative for hemorrhage or acute abnormality. CTA head and neck with R ICA occlusion and high-grade L ICA stenosis with soft plaque. Patient was started on neuro protocol heparin gtt and transferred to [facility] for stroke care and possible L ICA neurovascular intervention.       Interval hx       Good UO   Cr stable   No proteinuria     No past medical history 
Nephrology Progress Note                                                                                                                                                                                                                                                                                                                                                               Office : [phone]     Fax :[phone]    Patient's Name: Juan Cantor      Reason for Consult:  AMAYA   Requesting Physician:  Romeo Ritchie APRN - NP  Chief Complaint:    Chief Complaint   Patient presents with    feels jittery and shaky       Assessment/Plan     # CKD 3     # HTN    # Anemia    # Acid- base/ Electrolyte imbalance     # CVA     Plan   - Cr with mild fluctuation  - S/p CTA study this AM   - Ur studies - no proteinuria   - pt has AYAH +  - permissive HTN for now as pt has Ac CVA   - will need good BP control in future       Recommend to dose adjust all medications  based on renal functions  Maintain SBP> 90 mmHg   Daily weights   AVOID NSAIDs  Avoid Nephrotoxins  Monitor Intake/Output  Call if significant decrease in urine output      History of Present Ilness:    58 y.o. male w/ PMH of HTN, HLD, lung CA s/p lobectomy who presents with acute onset left hand weakness and numbness and speech difficulty.  Occurred around 7 am this morning, dropped a pencil out of his left hand and was unable to pick it back up with that hand. He attempted to call out for help but could not speak. He did notice left hand tingling at this time as well. Initial /94, NIHSS 2 for RIGHT nasolabial fold flattening and LEFT hemisensory deficit. CT head negative for hemorrhage or acute abnormality. CTA head and neck with R ICA occlusion and high-grade L ICA stenosis with soft plaque. Patient was started on neuro protocol heparin gtt and transferred to [facility] for stroke care and possible L ICA neurovascular intervention.       Interval hx:    Resting 
Nephrology Progress Note                                                                                                                                                                                                                                                                                                                                                               Office : [phone]     Fax :[phone]    Patient's Name: Juan Cantor      Reason for Consult:  AMAYA   Requesting Physician:  Romeo Ritchie APRN - NP  Chief Complaint:    Chief Complaint   Patient presents with    feels jittery and shaky       Assessment/Plan     # CKD 3     # HTN    # Anemia    # Acid- base/ Electrolyte imbalance     # CVA     Plan   - Cr overall stable   - Ur studies - no proteinuria   - pt has AYAH +  - permissive HTN for now as pt has Ac CVA   - will need good BP control in future       Recommend to dose adjust all medications  based on renal functions  Maintain SBP> 90 mmHg   Daily weights   AVOID NSAIDs  Avoid Nephrotoxins  Monitor Intake/Output  Call if significant decrease in urine output      History of Present Ilness:    58 y.o. male w/ PMH of HTN, HLD, lung CA s/p lobectomy who presents with acute onset left hand weakness and numbness and speech difficulty.  Occurred around 7 am this morning, dropped a pencil out of his left hand and was unable to pick it back up with that hand. He attempted to call out for help but could not speak. He did notice left hand tingling at this time as well. Initial /94, NIHSS 2 for RIGHT nasolabial fold flattening and LEFT hemisensory deficit. CT head negative for hemorrhage or acute abnormality. CTA head and neck with R ICA occlusion and high-grade L ICA stenosis with soft plaque. Patient was started on neuro protocol heparin gtt and transferred to [facility] for stroke care and possible L ICA neurovascular intervention.       Interval hx:      BP's acceptable  Good UOP  Cr 
Nephrology Progress Note                                                                                                                                                                                                                                                                                                                                                               Office : [phone]     Fax :[phone]    Patient's Name: Juan Cantor      Reason for Consult:  AMAYA   Requesting Physician:  Romeo Ritchie APRN - MIMI  Chief Complaint:    Chief Complaint   Patient presents with    feels jittery and shaky       Assessment/Plan     # AMAYA     # HTN    # Anemia    # Acid- base/ Electrolyte imbalance     # CVA     Plan   - Cr overall stable   - Ur studies - no proteinuria   - pt has AYAH +  - permissive HTN for now as pt has Ac CVA   - will need good BP control in future       Recommend to dose adjust all medications  based on renal functions  Maintain SBP> 90 mmHg   Daily weights   AVOID NSAIDs  Avoid Nephrotoxins  Monitor Intake/Output  Call if significant decrease in urine output      History of Present Ilness:    58 y.o. male w/ PMH of HTN, HLD, lung CA s/p lobectomy who presents with acute onset left hand weakness and numbness and speech difficulty.  Occurred around 7 am this morning, dropped a pencil out of his left hand and was unable to pick it back up with that hand. He attempted to call out for help but could not speak. He did notice left hand tingling at this time as well. Initial /94, NIHSS 2 for RIGHT nasolabial fold flattening and LEFT hemisensory deficit. CT head negative for hemorrhage or acute abnormality. CTA head and neck with R ICA occlusion and high-grade L ICA stenosis with soft plaque. Patient was started on neuro protocol heparin gtt and transferred to [facility] for stroke care and possible L ICA neurovascular intervention.       Interval hx:    Sitting up at edge of bed  Doing well  No 
Neuro assessment intact. VSS. PIVs removed. AVS reviewed. Education and Instructions complete. All questions answered and pt verbalized understanding. Ambulated to main lobby by this RN to be picked up by family member. Discharged home ~1240.  
No change in neuro exam overnight. Current NIHSS 0. Pt denies numbness/tingling in LUE as stated in prior exams. Pt remains hemodynamically stable.  
Occupational Therapy  Facility/Department: [facility] ICU  Occupational Therapy Initial Assessment /Treatment/Discharge     Name: Juan Cantor  : 1965  MRN: [id number]  Date of Service: 2024    Discharge Recommendations:  Home with assist PRN  OT Equipment Recommendations  Equipment Needed: No       Patient Diagnosis(es): The primary encounter diagnosis was TIA (transient ischemic attack). A diagnosis of Carotid stenosis, bilateral was also pertinent to this visit.  Past Medical History:  has no past medical history on file.  Past Surgical History:  has no past surgical history on file.           Assessment   Assessment: Pt demonstrates good function for ADL and mobility.  No acute OT needs identified.  Pt is to be in the hospital several more days for Heparin and will likely have Lft ICA revascularization this admission, timing TBD.  If there are any changes in pt's functional status, will need new OT orders.  Prognosis: Good  Decision Making: Low Complexity  REQUIRES OT FOLLOW-UP: Yes  Activity Tolerance  Activity Tolerance: Patient Tolerated treatment well        Plan   Occupational Therapy Plan  Additional Comments: Discharge pt from acute OT     Restrictions  Position Activity Restriction  Other position/activity restrictions: up as tolerated, SBP <220    Subjective   General  Chart Reviewed: Yes  Additional Pertinent Hx: Pt admitted 2/15/24 with expressive aphasia and weakness of the left hand and leg with ataxia.     CTA head/neck= Complete right internal carotid artery occlusion beginning in the proximal   cervical ICA through the supraclinoid segment. 2.Severe, 80-90% stenosis in the left proximal cervical internal carotid   artery due to atherosclerotic plaque. 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 No other intracranial steno-occlusive disease.  Family / Caregiver Present: No  Referring Practitioner: Dr. Scherer  Diagnosis: Rt gian 
Patient NPO starting at midnight d/t planned procedure later today.  Patient educated on reasons why and is understanding of plan of care.     Bed is in lowest position with wheels locked and bed alarm active. Call light is within reach.   
Patient admitted to room 4502 from [facility]. Patient is alert/oriented x 4.  NIH is 2.  Patient endorses mild numbness in 1rst two digits of left hand and thumb, and mild numbness in left upper leg that is \"improving.\"  Rachelle Villarreal at bedside, states expressive aphasia and dysarthria significantly improved from earlier in the morning, mild dysarthria at this time barely detectable.  Heparin gtt started per orders.  Will continue to monitor closely.  
Patient and taylor Villarreal at bedside endorse expressive aphasia, patient answers simple speech normally but is occasionally stumbling on words during conversation.  Dr. Gregorio at bedside and aware and Lin Moses CNP notified and is aware, see order.  Stat CT head completed.  NIH is a 1 at this time.  Will continue to monitor closely.  
Patient at MRI  
Patient doing well this evening. Heparin drip infusing but still sub therapeutic this evening.   Currently, he has no focal deficits on exam and his NIH is 0. He is complaining of a mild frontal headache. At the time of my exam, he was sitting off the side of the bed talking on the phone. Blood pressures are stable.  Plan for MRI this evening. Will follow up on results.     Discussed plan with nursing.    Jamie MATTHEW - CNP  Neurology and neuro critical care  [phone]        
Physical Therapy  Attempt    PT attempted to see pt for therapy session, pt currently off floor for procedure. Will follow up per POC.    Verena Daugherty PT, DPT, NCS, CSRS     
Physical Therapy  Facility/Department: [facility] ICU  Physical Therapy Initial Assessment / Treatment    Name: Juan Cantor  : 1965  MRN: [id number]  Date of Service: 2024    Discharge Recommendations:  24 hour supervision or assist   PT Equipment Recommendations  Equipment Needed: No      Patient Diagnosis(es): The primary encounter diagnosis was TIA (transient ischemic attack). A diagnosis of Carotid stenosis, bilateral was also pertinent to this visit.  Past Medical History:  has no past medical history on file.  Past Surgical History:  has no past surgical history on file.    Assessment   Body Structures, Functions, Activity Limitations Requiring Skilled Therapeutic Intervention: Decreased functional mobility   Assessment: Pt is 58 y.o. male admit from home with acute CVA.  Pt is slightly below his functional baseline.  Demos good LE strength and balance with amb of limited distances.  Assessment limited due to new onset of word finding difficulties.  Will further assess higher level amb including steps at future visit.  Note future plans for surgical intervention this admission.  Will follow.  Anticipate pt will be safe to d/c home with 24hr assist initially.  Treatment Diagnosis: impaired functional mobility  Therapy Prognosis: Good  Decision Making: Low Complexity  Requires PT Follow-Up: Yes  Activity Tolerance  Activity Tolerance Comments: BP: 126/103, HR 79bpm.  While seated EOB and obtaining social history, pt noted to have 3-4 brief episodes of difficulty with word finding.  RN and MD notified and performed neuro assessments.  Pt eager to amb with PT.  Pt cleared for amb per MD and RN.     Plan   Physical Therapy Plan  General Plan:  (2-5)  Current Treatment Recommendations: Gait training, Balance training, Functional mobility training, Stair training, Transfer training, Patient/Caregiver education & training, Therapeutic activities  Safety Devices  Type of Devices: Left in chair, Call light 
Pt A/Ox4, VSS, afebrile, sp02 WNL on RA. Denies pain. No neuro deficits noted. NIH negative. Call light in reach. Instructed to call for needs. Will cont to monitor.  
Pt has intermittent HA described as pressure which was relieved with Tylenol and ambulating. Otherwise, there have been no neuro changes overnight. Pt took 5 laps around the unit with no issues/ stand by assist. Remains hemodynamically stable.   
[facility] Hospitalist brief note  Consult received.  Case reviewed with ER physician Dr. Donal Leos    58-year-old gentleman with a past medical history of dyslipidemia, hypertension, GERD, lung cancer who presented to the ER for evaluation of expressive aphasia and weakness of the left hand and leg with ataxia.  Onset of symptoms was at 7:30 AM.  Speech improved.  CT head unremarkable, CT angiogram of the head and neck showed complete occlusion of the right carotid artery.  ER discussed the case with on-call neurosurgeon Dr. Martel recommended admission to neuro ICU heparin drip without bolus and possible intervention overnight or tomorrow    Full note to follow.      Thanks  Danika Gregorio MD  Hospital Medicine  [facility]    
  Plan:  Neuro protocol heparin gtt for 5 days given concern for intraluminal thrombus in L ICA  -repeat CTA Tuesday morning   -revascularization plans to follow repeat CTA results   Neurology following for stroke management   Ok for q 4 hour neuro checks  SBP < 220, avoid hypotension  PT/OT/ST  Ok for diet/acitivty as tolerated   Will follow. Please call with questions        DISPO- Dispo timing to be determined by primary team once patient is medically stable for discharge.     VIPUL Wood-CNP  Neurosurgery Nurse Practitioner  [phone]    Patient was discussed with Dr. Martel who agrees with above assessment and plan.     Electronically signed by: VIPUL Lagunas - NP, 2/16/2024 11:19 AM    
Exam:  GCS:  4 - Opens eyes on own  5 - Alert and oriented  6 - Follows simple motor commands    Mental Status: Awake, alert, oriented x 4   Language: No aphasia or dysarthria noted  Sensation: Intact to all extremities to light touch  Coordination: Intact    Cranial Nerves:  II: Visual acuity not tested, visual fields intact, denies new visual changes / diplopia  III, IV, VI: PERRL, 3 mm bilaterally, EOMI, no nystagmus noted  V: Facial sensation intact bilaterally to touch  VII: Face symmetric  VIII: Hearing intact bilaterally to spoken voice  IX: Palate movement equal bilaterally  XI: Shoulder shrug equal bilaterally  XII: Tongue midline    Musculoskeletal:   Gait: Not tested   Tone: normal   Motor strength:    Right  Left    Right  Left    Deltoid  5 5  Hip Flex  5 5   Biceps  5 5  Knee Extensors  5 5   Triceps  5 5  Knee Flexors  5 5   Wrist Ext  5 5  Ankle Dorsiflex.  5 5   Wrist Flex  5 5  Ankle Plantarflex.  5 5   Handgrip  5 5  Ext Joey Longus  5 5   Thumb Ext  5 5           Assessment:  59 yo male w/ sudden onset left hand numbness/weakness and word finding difficulty-symptoms have since resolved. CTA reveals right ICA occlusion and L ICA stenosis w/ possible intraluminal thrombus. Tiny R sided stroke on MRI.         Plan:  Repeat imaging shows recanalization of the right internal carotid with residual string sign in the bulb, but new visible contrast along the entire cervical segment to the terminus   Load Plavix today and start 75mg daily  Stop heparin after plavix load   Start aspirin 325mg daily today  Plan angiography tomorrow for possible VALE stent  -NPO @ 0000  Avoid hypotension   Will follow, please call with questions     DISPO- Dispo timing to be determined by primary team once patient is medically stable for discharge.     Adriana Chand, VIPUL-CNP  Neurosurgery Nurse Practitioner  [phone]    Patient was discussed with Dr. Martel who agrees with above assessment and plan.     Electronically 
mL, IntraVENous, 2 times per day    rosuvastatin, 40 mg, Oral, Nightly   Infusions    sodium chloride                LABS   Metabolic Panel Recent Labs     02/19/24  0506 02/20/24  0351 02/21/24  0547    141 138   K 3.9 4.2 4.4    106 104   CO2 21 22 20*   BUN 15 16 17   CREATININE 1.4* 1.5* 1.6*   GLUCOSE 106* 104* 95   CALCIUM 9.6 9.7 9.9   MG 2.00 2.00 2.10        CBC / Coags Recent Labs     02/19/24  0506 02/20/24  0351 02/21/24  0547   WBC 9.4 9.8 10.9   RBC 5.45 5.32 5.58   HGB 14.8 14.5 14.9   HCT 45.9 44.8 46.6    164 177   INR  --   --  0.95          DIAGNOSTICS   IMAGES:  Images personally reviewed and agree w/ radiology interpretation of the following images    CTA of Head / Neck w/ Contrast: 2/20/24; 0704  1. Redemonstration of occlusion of the right internal carotid artery just above  its origin. Since prior CTA however, there has been some reconstitution of flow  all the way up to the level of the carotid terminus. The lumen of the vessel is  still compromised by approximately 80%.  2. Redemonstration of severe narrowing of the left proximal internal carotid    CTA of Head / Neck w/ Contrast: 2/15/24; 08:14  1.  Complete right internal carotid artery occlusion beginning in the proximal  cervical ICA through the supraclinoid segment.  2.  Severe, 80-90% stenosis in the left proximal cervical internal carotid  artery due to atherosclerotic plaque.  3.  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 No other intracranial steno-occlusive disease.    Head CT w/o Contrast: 2/16/24; 13:17  No acute intracranial abnormality.     MRI Brain w/o Contrast: 2/16/24; 00:24  1. Punctate diffusion signal abnormality in the posterior lateral temporal  cortex compatible with a tiny distal infarct.  2. No acute intracranial abnormality otherwise identified.  3. Bilateral ethmoid and right maxillary sinus disease.      PHYSICAL EXAMINATION     PHYSICAL 
numbness/weakness and word finding difficulty-symptoms have since resolved. CTA reveals right ICA occlusion and L ICA stenosis w/ possible intraluminal thrombus. Tiny R sided stroke on MRI. Transient loss of R eye vision for 1 minute today, no change in plan        Plan:  Neuro protocol heparin gtt for 5 days given concern for intraluminal thrombus in L ICA  -repeat CTA tomorrow morning at 0600  -revascularization plans to follow repeat CTA results   Neurology following for stroke management   q 4 hour neuro checks  PT/OT/ST  Ok for diet/acitivty as tolerated   Will follow. Please call with questions      DISPO- Dispo timing to be determined by primary team once patient is medically stable for discharge.     VIPUL Wood-CNP  Neurosurgery Nurse Practitioner  [phone]    Patient was discussed with Dr. Martel who agrees with above assessment and plan.     Electronically signed by: VIPUL Lagunas NP, 2/19/2024 11:45 AM    
occlusion and L ICA stenosis w/ possible intraluminal thrombus. Tiny R sided stroke on MRI        Plan:  Neuro protocol heparin gtt for 5 days given concern for intraluminal thrombus in L ICA  -repeat CTA Tuesday morning   -revascularization plans to follow repeat CTA results   Neurology following for stroke management   Repeat head CT stable yesterday   Ok for q 4 hour neuro checks  SBP < 220, avoid hypotension  PT/OT/ST  Ok for diet/acitivty as tolerated   Will follow. Please call with questions      DISPO- Dispo timing to be determined by primary team once patient is medically stable for discharge.     VIPUL Wood-CNP  Neurosurgery Nurse Practitioner  [phone]    Patient was discussed with Dr. Martel who agrees with above assessment and plan.     Electronically signed by: VIPUL Lagunas NP, 2/17/2024 6:01 AM    
severe narrowing of the left proximal internal carotid artery, approximately 80%, in accordance with NASCET criteria.    Neurologic Exam:  GCS:  4 - Opens eyes on own  5 - Alert and oriented  6 - Follows simple motor commands    Mental Status: Awake, alert, oriented x 4   Language: No aphasia or dysarthria noted  Sensation: Intact to all extremities to light touch  Coordination: Intact    Cranial Nerves:  II: Visual acuity not tested, visual fields intact, denies new visual changes / diplopia  III, IV, VI: PERRL, 3 mm bilaterally, EOMI, no nystagmus noted  V: Facial sensation intact bilaterally to touch  VII: Face symmetric  VIII: Hearing intact bilaterally to spoken voice  IX: Palate movement equal bilaterally  XI: Shoulder shrug equal bilaterally  XII: Tongue midline    Musculoskeletal:   Gait: Not tested   Tone: normal   Motor strength:    Right  Left    Right  Left    Deltoid  5 5  Hip Flex  5 5   Biceps  5 5  Knee Extensors  5 5   Triceps  5 5  Knee Flexors  5 5   Wrist Ext  5 5  Ankle Dorsiflex.  5 5   Wrist Flex  5 5  Ankle Plantarflex.  5 5   Handgrip  5 5  Ext Joey Longus  5 5   Thumb Ext  5 5         R groin site c/d/I     Assessment:  57 yo male w/ sudden onset left hand numbness/weakness and word finding difficulty-symptoms have since resolved. CTA reveals right ICA occlusion and L ICA stenosis w/ possible intraluminal thrombus. Tiny R sided stroke on MRI. Repeat imaging shows recanalization of the right internal carotid with residual string sign in the bulb, but new visible contrast along the entire cervical segment to the terminus. Now s/p R ICA stent with Dr. Martel     Plan:  Q 4 hour neuro checks  Groin checks per protocol  Plavix 75 mg daily  Aspirin 325 mg daily  Activity/diet as tolerated  Pain control  Ok to dc from NSGY standpoint, f/u in 4-6 weeks     DISPO- Dispo timing to be determined by primary team once patient is medically stable for discharge.     Patient was discussed with Dr. Martel 
well   Currently NPO  Anticipating R ICA stenting later today     BP's acceptable   Cr up. S/p contrast yesterday       No past medical history on file.    No past surgical history on file.    No family history on file.     reports that he has been smoking. He has never used smokeless tobacco. He reports current alcohol use. He reports current drug use. Drug: Marijuana (Weed).    Allergies:  Atorvastatin and Ibuprofen    Current Medications:    aspirin chewable tablet 324 mg, Daily  clopidogrel (PLAVIX) tablet 75 mg, Daily  albuterol (PROVENTIL) (2.5 MG/3ML) 0.083% nebulizer solution 5 mg, 4x Daily PRN  pantoprazole (PROTONIX) tablet 40 mg, QAM AC  melatonin disintegrating tablet 10 mg, Nightly PRN  sodium chloride flush 0.9 % injection 5-40 mL, 2 times per day  sodium chloride flush 0.9 % injection 5-40 mL, PRN  0.9 % sodium chloride infusion, PRN  ondansetron (ZOFRAN-ODT) disintegrating tablet 4 mg, Q8H PRN   Or  ondansetron (ZOFRAN) injection 4 mg, Q6H PRN  polyethylene glycol (GLYCOLAX) packet 17 g, Daily PRN  acetaminophen (TYLENOL) tablet 650 mg, Q6H PRN   Or  acetaminophen (TYLENOL) suppository 650 mg, Q6H PRN  rosuvastatin (CRESTOR) tablet 40 mg, Nightly  perflutren lipid microspheres (DEFINITY) injection 1.5 mL, ONCE PRN        Physical exam:     Vitals:  /84   Pulse 71   Temp 97.6 °F (36.4 °C) (Oral)   Resp 16   Ht 1.867 m (6' 1.5\")   Wt 97.3 kg (214 lb 8.1 oz)   SpO2 100%   BMI 27.92 kg/m²   Constitutional:  OAA X3 NAD Yes  Skin: no rash, turgor wnl  Heent:  eomi, mmm  Neck: no bruits or jvd noted  Cardiovascular:  S1, S2 without m/r/g  Respiratory: CTA B without w/r/r  Abdomen:  , soft, nt, nd  Ext:  lower extremity edema Yes  Psychiatric: mood and affect flat   Musculoskeletal:  Rom, muscular strength intact    Data:   Labs:  CBC:   Recent Labs     02/19/24  0506 02/20/24  0351 02/21/24  0547   WBC 9.4 9.8 10.9   HGB 14.8 14.5 14.9    164 177       BMP:    Recent Labs     
daily  Aspirin 325 mg daily  Plan angiography today @1300 for possible VALE stent  Avoid hypotension  NPO since midnight  Will follow, please call with questions     DISPO- Dispo timing to be determined by primary team once patient is medically stable for discharge.     Patient was discussed with Dr. Martel who agrees with above assessment and plan.     Electronically signed by: VIPUL Mcadams CNP, 2/21/2024 12:42 PM  [phone]  
intracranial abnormality.     MRI Brain w/o Contrast: 2/16/24; 00:24  1. Punctate diffusion signal abnormality in the posterior lateral temporal  cortex compatible with a tiny distal infarct.  2. No acute intracranial abnormality otherwise identified.  3. Bilateral ethmoid and right maxillary sinus disease.      PHYSICAL EXAMINATION     PHYSICAL EXAM:  Vitals:    02/20/24 0600 02/20/24 0700 02/20/24 0800 02/20/24 0900   BP:   (!) 131/93    Pulse: 77 61 63 68   Resp:   18    Temp:   97.9 °F (36.6 °C)    TempSrc:   Oral    SpO2:   100%    Weight:       Height:           General: Alert, no distress, well-nourished  Neurologic  Mental status: Oriented x4. Follows commands without any difficulty. Speech is fluent and clear.  NIH: 0    Cranial nerves:   CN2: Visual fields full w/o extinction on confrontational testing   CN 3,4,6: Pupils equal and reactive to light, extraocular muscles intact  CN5: Facial sensation symmetric   CN7: Face symmetric  CN8: Hearing symmetric to spoken voice  CN9: Palate elevated symmetrically  CN11: Traps full strength on shoulder shrug  CN12: Tongue midline with protrusion    Motor Exam:  5/5 strength throughout    Sensory: Light touch intact and symmetric in all 4 extremities.  No sensory extinction on bilateral simultaneous stimulation  Cerebellar/coordination: finger nose finger normal without ataxia  Tone: normal in all 4 extremities  Gait: steady gait, able to walk unassisted    OTHER SYSTEMS:  Cardiovascular: Warm, appears well perfused   Respiratory: Easy, non-labored respiratory pattern   Abdominal: Abdomen is without distention   Extremities: Upper and lower extremities are atraumatic in appearance without deformity. No swelling or erythema     I spent 30 minutes in the care of this patient.  Over 50% of that time was in face-to-face counseling regarding disease process, diagnostic testing, preventative measures, and answering patient and family questions.   
intact/collateralized   [place]. No other intracranial steno-occlusive disease.      Critical result of right ICA large vessel occlusion communicated to ED physician   Dr. Leos at 8:29 a.m. on 2/15/2024      CT HEAD WO CONTRAST   Final Result      No acute intracranial hemorrhage or mass effect.      Result discussed with the ED physician Dr. Leos on 2/15/2024 at 8:29 a.m.          Medical Decision Making:  The following items were considered in medical decision making:  Discussion of patient care with other providers  Reviewed clinical lab tests  Reviewed radiology tests  Reviewed other diagnostic tests/interventions    Rohini Ribeiro PA-C   [facility]  Office : [phone] or through [employer]  Fax :[phone]     I have seen the patient independently from the PA/NP .I discussed the care with the PA/NP . I personally reviewed the HPI, PH, FH, SH, ROS and medications. I repeated pertinent portions of the examination and reviewed the relevant imaging and laboratory data. I agree with the findings, assessment and plan as documented, with the following addendum:        Hector Grier MD      
and reviewed the relevant imaging and laboratory data. I agree with the findings, assessment and plan as documented, with the following addendum:        Hector Grier MD      [facility]  Office : [phone] or through [employer]  Fax :[phone]     
segments. No significant stenosis otherwise seen. Proximal segments of the bilateral middle cerebral arteries are patent without significant stenosis. Bilateral posterior communicating arteries are present. The basilar artery is normal in caliber. Proximal segments of the bilateral posterior cerebral arteries are patent without significant stenosis. No intracranial aneurysm or arteriovenous malformation.     1.  Complete right internal carotid artery occlusion beginning in the proximal cervical ICA through the supraclinoid segment. 2.  Severe, 80-90% stenosis in the left proximal cervical internal carotid artery due to atherosclerotic plaque. 3.  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 No other intracranial steno-occlusive disease. Critical result of right ICA large vessel occlusion communicated to ED physician Dr. Leos at 8:29 a.m. on 2/15/2024    CT HEAD WO CONTRAST    Result Date: 2/15/2024  CT CODE NEURO HEAD WO CONTRAST INDICATION: expressive aphasia, weakness left COMPARISON: None. TECHNIQUE:  CT head was performed without intravenous contrast. Coronal and sagittal reformations were created. Up-to-date CT equipment and radiation dose reduction techniques were employed. FINDINGS: No acute intracranial hemorrhage or extra-axial fluid collection. No mass effect or midline shift. Gray matter differentiation is maintained. Ventricles are normal in size. Basal cisterns are patent. Atherosclerosis in the internal carotid arteries. Calvarium is intact. Mucosal thickening in the paranasal sinuses. Mastoid air cells are clear.     No acute intracranial hemorrhage or mass effect. Result discussed with the ED physician Dr. Leos on 2/15/2024 at 8:29 a.m.      CBC:   Recent Labs     02/18/24  0602 02/19/24  0506 02/20/24  0351   WBC 9.0 9.4 9.8   HGB 15.1 14.8 14.5    188 164     BMP:    Recent Labs     02/18/24  0602 02/19/24  0506 02/20/24  0351   NA 
02/19/24  0506 02/20/24  0351 02/21/24  0547    141 138   K 3.9 4.2 4.4    106 104   CO2 21 22 20*   BUN 15 16 17   CREATININE 1.4* 1.5* 1.6*   GLUCOSE 106* 104* 95       Hepatic: No results for input(s): \"AST\", \"ALT\", \"ALB\", \"BILITOT\", \"ALKPHOS\" in the last 72 hours.  Lipids:   Lab Results   Component Value Date/Time    CHOL 258 02/15/2024 09:35 AM    HDL 34 02/15/2024 09:35 AM    TRIG 189 02/15/2024 09:35 AM     Hemoglobin A1C:   Lab Results   Component Value Date/Time    LABA1C 6.0 02/16/2024 03:00 AM     TSH: No results found for: \"TSH\"  Troponin: No results found for: \"TROPONINT\"  Lactic Acid: No results for input(s): \"LACTA\" in the last 72 hours.  BNP: No results for input(s): \"PROBNP\" in the last 72 hours.  UA:No results found for: \"NITRU\", \"COLORU\", \"PHUR\", \"LABCAST\", \"WBCUA\", \"RBCUA\", \"MUCUS\", \"TRICHOMONAS\", \"YEAST\", \"BACTERIA\", \"CLARITYU\", \"SPECGRAV\", \"LEUKOCYTESUR\", \"UROBILINOGEN\", \"BILIRUBINUR\", \"BLOODU\", \"GLUCOSEU\", \"KETUA\", \"AMORPHOUS\"  Urine Cultures: No results found for: \"LABURIN\"  Blood Cultures: No results found for: \"BC\"  No results found for: \"BLOODCULT2\"  Organism: No results found for: \"ORG\"      Electronically signed by Giovanny Harris MD on 2/21/2024 at 5:28 PM

## 2024-02-23 NOTE — DISCHARGE SUMMARY
V2.0  Discharge Summary    Name:  Juan Cantor /Age/Sex: 1965 (58 y.o. male)   Admit Date: 2/15/2024  Discharge Date: 24    MRN & CSN:  [id number] & [id number] Encounter Date and Time 24 7:46 PM EST    Attending:  No att. providers found Discharging Provider: Giovanny Harris MD       Hospital Course:     58-year-old male with history of hypertension, dyslipidemia, lung cancer status post lobectomy, GERD presenting with focal neurologic deficits and weakness of the left side.  Admitted with complete occlusion of the right carotid artery and 80 to 90% occlusion of the left proximal ICA.  Was started on a heparin drip with neurosurgery consulted.  Neurology also consulted.  Heparin drip stopped and patient loaded with Plavix start with 75 mg p.o. daily along with aspirin. Did undergo R ICA stent placement without complications. Also on statin. Tolerated procedure well and in 2024 patient was deemed fit for discharge.         Bilateral carotid artery stenosis s/p R ICA stenting on 2024  Left-sided numbness and weakness  -To follow up outpatient with Neurosurgery.   -Discharge on ASA, Plavix, Statin.        Hypertension  -Restart patient's home BP meds on discharge. This was discussed with neurosurgery.      Dyslipidemia  -Crestor at discharge.      CKD stage IIIa  -Stable throughout hospitalization.      Lung cancer status post lobectomy  -Stable.      The patient expressed appropriate understanding of, and agreement with the discharge recommendations, medications, and plan.     Consults this admission:  IP CONSULT TO NEUROSURGERY  IP CONSULT TO NEUROLOGY  IP CONSULT TO NEUROCRITICAL CARE    Discharge Diagnosis:   Right carotid artery occlusion  Hypertension  Adenocarcinoma of lung s/p lobectomy  Dyslipidemia  Left sided weakness    Discharge Instruction:   Follow up appointments:   Primary care physician: Romeo Ritchie APRN - NP within 2 weeks  Diet: cardiac diet

## 2024-04-30 ENCOUNTER — HOSPITAL ENCOUNTER (OUTPATIENT)
Dept: SPEECH THERAPY | Age: 59
Setting detail: THERAPIES SERIES
Discharge: HOME | End: 2024-04-30

## 2024-04-30 NOTE — PROGRESS NOTES
SLP ALL NOTES    Patient did not arrive for scheduled evaluation. Called patient who reports he forgot about appointment. Attempted to re-schedule for Thursday 5/2/24 at 11:15 am. Patient uncertain if he is available. Patient provided phone number and advised to call back to reschedule.     Rocio Stark MA, CCC-SLP #[id number]  Speech-Language Pathologist  On 04/30/24 at 11:15 am

## 2024-07-05 ENCOUNTER — APPOINTMENT (OUTPATIENT)
Dept: GENERAL RADIOLOGY | Age: 59
End: 2024-07-05
Payer: MEDICAID

## 2024-07-05 ENCOUNTER — HOSPITAL ENCOUNTER (EMERGENCY)
Age: 59
Discharge: HOME | End: 2024-07-05
Attending: EMERGENCY MEDICINE
Payer: MEDICAID

## 2024-07-05 VITALS
WEIGHT: 212.74 LBS | HEIGHT: 73 IN | DIASTOLIC BLOOD PRESSURE: 91 MMHG | TEMPERATURE: 98.9 F | RESPIRATION RATE: 18 BRPM | OXYGEN SATURATION: 98 % | SYSTOLIC BLOOD PRESSURE: 121 MMHG | BODY MASS INDEX: 28.2 KG/M2 | HEART RATE: 80 BPM

## 2024-07-05 DIAGNOSIS — M25.561 ACUTE PAIN OF RIGHT KNEE: Primary | ICD-10-CM

## 2024-07-05 PROCEDURE — 6360000002 HC RX W HCPCS: Performed by: EMERGENCY MEDICINE

## 2024-07-05 PROCEDURE — 73562 X-RAY EXAM OF KNEE 3: CPT

## 2024-07-05 PROCEDURE — 99284 EMERGENCY DEPT VISIT MOD MDM: CPT

## 2024-07-05 PROCEDURE — 96372 THER/PROPH/DIAG INJ SC/IM: CPT

## 2024-07-05 PROCEDURE — 6370000000 HC RX 637 (ALT 250 FOR IP): Performed by: EMERGENCY MEDICINE

## 2024-07-05 RX ORDER — HYDROCODONE BITARTRATE AND ACETAMINOPHEN 5; 325 MG/1; MG/1
1 TABLET ORAL ONCE
Status: COMPLETED | OUTPATIENT
Start: 2024-07-05 | End: 2024-07-05

## 2024-07-05 RX ORDER — DEXAMETHASONE SODIUM PHOSPHATE 4 MG/ML
6 INJECTION, SOLUTION INTRA-ARTICULAR; INTRALESIONAL; INTRAMUSCULAR; INTRAVENOUS; SOFT TISSUE ONCE
Status: COMPLETED | OUTPATIENT
Start: 2024-07-05 | End: 2024-07-05

## 2024-07-05 RX ORDER — PREDNISONE 20 MG/1
40 TABLET ORAL DAILY
Qty: 8 TABLET | Refills: 0 | Status: SHIPPED | OUTPATIENT
Start: 2024-07-05 | End: 2024-07-09

## 2024-07-05 RX ORDER — METHYLPREDNISOLONE ACETATE 80 MG/ML
60 INJECTION, SUSPENSION INTRA-ARTICULAR; INTRALESIONAL; INTRAMUSCULAR; SOFT TISSUE ONCE
Status: DISCONTINUED | OUTPATIENT
Start: 2024-07-05 | End: 2024-07-05

## 2024-07-05 RX ORDER — HYDROCODONE BITARTRATE AND ACETAMINOPHEN 5; 325 MG/1; MG/1
1 TABLET ORAL EVERY 8 HOURS PRN
Qty: 6 TABLET | Refills: 0 | Status: SHIPPED | OUTPATIENT
Start: 2024-07-05 | End: 2024-07-08

## 2024-07-05 RX ADMIN — DEXAMETHASONE SODIUM PHOSPHATE 6 MG: 4 INJECTION INTRA-ARTICULAR; INTRALESIONAL; INTRAMUSCULAR; INTRAVENOUS; SOFT TISSUE at 07:33

## 2024-07-05 RX ADMIN — HYDROCODONE BITARTRATE AND ACETAMINOPHEN 1 TABLET: 5; 325 TABLET ORAL at 07:11

## 2024-07-05 ASSESSMENT — PAIN DESCRIPTION - DESCRIPTORS
DESCRIPTORS: THROBBING
DESCRIPTORS: THROBBING

## 2024-07-05 ASSESSMENT — PAIN DESCRIPTION - ORIENTATION
ORIENTATION: LEFT

## 2024-07-05 ASSESSMENT — PAIN SCALES - GENERAL
PAINLEVEL_OUTOF10: 10
PAINLEVEL_OUTOF10: 10
PAINLEVEL_OUTOF10: 9

## 2024-07-05 ASSESSMENT — PAIN - FUNCTIONAL ASSESSMENT
PAIN_FUNCTIONAL_ASSESSMENT: 0-10
PAIN_FUNCTIONAL_ASSESSMENT: 0-10

## 2024-07-05 ASSESSMENT — PAIN DESCRIPTION - LOCATION
LOCATION: KNEE

## 2024-07-05 NOTE — ED PROVIDER NOTES
[facility] Emergency Department      Pt Name: Juan Cantor  MRN: [id number]  Birthdate 1965  Date of evaluation: 7/5/2024  Provider: KIRSTIE CRUZ MD  CHIEF COMPLAINT  Chief Complaint   Patient presents with    Knee Pain     Pt to ED c/o atraumatic left knee pain starting yesterday, states has been dealing with chronic knee pain but that this is much worse than what he is used to dealing with, denies injury, AAOx4, NAD, resp e/u on RA, ambulatory from waiting room with cane.     HPI  Juan Cantor is a 58 y.o. male who presents because of left knee pain.  He reports the pain started last night.  He went to some Fourth of [place] celebration.  He denies any known injury but when he got back, his left knee was hurting a lot.  He had difficulty sleeping because of the degree of pain.  He has had pain with his knees before but never to this degree.  He denies any recent illness.  Denies any fever or chills.  Has not noted any swelling.  He has been able to walk but has been using a cane.    REVIEW OF SYSTEMS:  No injury, swelling absent Pertinent positives and negatives as per the HPI.  All other pertinent review of systems reviewed and negative.  Nursing notes reviewed.    PAST MEDICAL HISTORY  No past medical history on file.  SURGICAL HISTORY  No past surgical history on file.  MEDICATIONS:  No current facility-administered medications on file prior to encounter.     Current Outpatient Medications on File Prior to Encounter   Medication Sig Dispense Refill    rosuvastatin (CRESTOR) 40 MG tablet Take 1 tablet by mouth nightly 30 tablet 3    clopidogrel (PLAVIX) 75 MG tablet Take 1 tablet by mouth daily 30 tablet 3    aspirin (MARIA ESTHER ASPIRIN) 325 MG tablet Take 1 tablet by mouth daily 30 tablet 3    amLODIPine (NORVASC) 10 MG tablet Take 1 tablet by mouth daily      losartan-hydroCHLOROthiazide (HYZAAR) 100-12.5 MG per tablet Take 1 tablet by mouth daily      albuterol (PROVENTIL) (5 MG/ML) 0.5% nebulizer

## 2024-07-31 ENCOUNTER — OFFICE VISIT (OUTPATIENT)
Dept: ORTHOPEDIC SURGERY | Age: 59
End: 2024-07-31

## 2024-07-31 VITALS — BODY MASS INDEX: 29.03 KG/M2 | WEIGHT: 219 LBS | HEIGHT: 73 IN

## 2024-07-31 DIAGNOSIS — M17.12 PRIMARY OSTEOARTHRITIS OF LEFT KNEE: Primary | ICD-10-CM

## 2024-07-31 RX ORDER — LIDOCAINE HYDROCHLORIDE 10 MG/ML
4 INJECTION, SOLUTION INFILTRATION; PERINEURAL ONCE
Status: COMPLETED | OUTPATIENT
Start: 2024-07-31 | End: 2024-07-31

## 2024-07-31 RX ORDER — TRIAMCINOLONE ACETONIDE 40 MG/ML
40 INJECTION, SUSPENSION INTRA-ARTICULAR; INTRAMUSCULAR ONCE
Status: COMPLETED | OUTPATIENT
Start: 2024-07-31 | End: 2024-07-31

## 2024-07-31 RX ADMIN — TRIAMCINOLONE ACETONIDE 40 MG: 40 INJECTION, SUSPENSION INTRA-ARTICULAR; INTRAMUSCULAR at 16:02

## 2024-07-31 RX ADMIN — LIDOCAINE HYDROCHLORIDE 4 ML: 10 INJECTION, SOLUTION INFILTRATION; PERINEURAL at 16:03

## 2024-08-01 NOTE — PROGRESS NOTES
CHIEF COMPLAINT: Left knee pain/osteoarthritis.    HISTORY:  Mr. Cantor 58 y.o.  male presents today for the first visit for evaluation of left knee pain which started to worsen in July 2024, but has been waxing and waning for years and now worse. He initially presented to [facility] on 7/5/2024 where he was Xrayed and evaluated and referred to orthopedics.  He is complaining of achy pain.  Pain is increase with standing and walking and decrease with rest. Pain is sharp early in the morning with first few steps, dull achy pain by the end of the day. Alleviating factors: rest. No radiation and no numbness and tingling sensation. No other complaint.  No h/o trauma or gout. His jobs was installing floors and construction for many years. He was seen at [facility] in 2023 and had Cortisone with good improvement.     No past medical history on file.    No past surgical history on file.    Social History     Socioeconomic History    Marital status: Single     Spouse name: Not on file    Number of children: Not on file    Years of education: Not on file    Highest education level: Not on file   Occupational History    Not on file   Tobacco Use    Smoking status: Some Days    Smokeless tobacco: Never   Vaping Use    Vaping Use: Never used   Substance and Sexual Activity    Alcohol use: Yes     Comment: social    Drug use: Yes     Types: Marijuana (Weed)    Sexual activity: Not on file   Other Topics Concern    Not on file   Social History Narrative    Not on file     Social Determinants of Health     Financial Resource Strain: Not on file   Food Insecurity: No Food Insecurity (2/15/2024)    Hunger Vital Sign     Worried About Running Out of Food in the Last Year: Never true     Ran Out of Food in the Last Year: Never true   Transportation Needs: No Transportation Needs (2/15/2024)    PRAPARE - Transportation     Lack of Transportation (Medical): No     Lack of Transportation (Non-Medical): No   Physical

## 2024-08-06 ENCOUNTER — HOSPITAL ENCOUNTER (EMERGENCY)
Age: 59
Discharge: HOME | End: 2024-08-07
Attending: EMERGENCY MEDICINE
Payer: MEDICAID

## 2024-08-06 VITALS
OXYGEN SATURATION: 98 % | HEIGHT: 74 IN | TEMPERATURE: 98.2 F | BODY MASS INDEX: 27.42 KG/M2 | SYSTOLIC BLOOD PRESSURE: 118 MMHG | RESPIRATION RATE: 18 BRPM | HEART RATE: 66 BPM | DIASTOLIC BLOOD PRESSURE: 78 MMHG | WEIGHT: 213.63 LBS

## 2024-08-06 DIAGNOSIS — N18.9 CHRONIC KIDNEY DISEASE, UNSPECIFIED CKD STAGE: ICD-10-CM

## 2024-08-06 DIAGNOSIS — M62.82 EXERTIONAL RHABDOMYOLYSIS: Primary | ICD-10-CM

## 2024-08-06 LAB
ALBUMIN SERPL-MCNC: 4.2 G/DL (ref 3.4–5)
ALBUMIN/GLOB SERPL: 1.6 {RATIO} (ref 1.1–2.2)
ALP SERPL-CCNC: 67 U/L (ref 40–129)
ALT SERPL-CCNC: 25 U/L (ref 10–40)
ANION GAP SERPL CALCULATED.3IONS-SCNC: 13 MMOL/L (ref 3–16)
AST SERPL-CCNC: 45 U/L (ref 15–37)
BASOPHILS # BLD: 0.1 K/UL (ref 0–0.2)
BASOPHILS NFR BLD: 0.9 %
BILIRUB SERPL-MCNC: <0.2 MG/DL (ref 0–1)
BUN SERPL-MCNC: 32 MG/DL (ref 7–20)
CALCIUM SERPL-MCNC: 8.8 MG/DL (ref 8.3–10.6)
CHLORIDE SERPL-SCNC: 106 MMOL/L (ref 99–110)
CK SERPL-CCNC: 1764 U/L (ref 39–308)
CO2 SERPL-SCNC: 20 MMOL/L (ref 21–32)
CREAT SERPL-MCNC: 1.9 MG/DL (ref 0.9–1.3)
DEPRECATED RDW RBC AUTO: 14.9 % (ref 12.4–15.4)
EOSINOPHIL # BLD: 0.2 K/UL (ref 0–0.6)
EOSINOPHIL NFR BLD: 2.1 %
GFR SERPLBLD CREATININE-BSD FMLA CKD-EPI: 40 ML/MIN/{1.73_M2}
GLUCOSE SERPL-MCNC: 106 MG/DL (ref 70–99)
HCT VFR BLD AUTO: 40.8 % (ref 40.5–52.5)
HGB BLD-MCNC: 13.1 G/DL (ref 13.5–17.5)
LYMPHOCYTES # BLD: 1.9 K/UL (ref 1–5.1)
LYMPHOCYTES NFR BLD: 23.3 %
MCH RBC QN AUTO: 26.8 PG (ref 26–34)
MCHC RBC AUTO-ENTMCNC: 32.2 G/DL (ref 31–36)
MCV RBC AUTO: 83.2 FL (ref 80–100)
MONOCYTES # BLD: 0.8 K/UL (ref 0–1.3)
MONOCYTES NFR BLD: 10.4 %
NEUTROPHILS # BLD: 5 K/UL (ref 1.7–7.7)
NEUTROPHILS NFR BLD: 63.3 %
PLATELET # BLD AUTO: 171 K/UL (ref 135–450)
PMV BLD AUTO: 9.2 FL (ref 5–10.5)
POTASSIUM SERPL-SCNC: 4.2 MMOL/L (ref 3.5–5.1)
PROT SERPL-MCNC: 6.8 G/DL (ref 6.4–8.2)
RBC # BLD AUTO: 4.9 M/UL (ref 4.2–5.9)
SODIUM SERPL-SCNC: 139 MMOL/L (ref 136–145)
WBC # BLD AUTO: 8 K/UL (ref 4–11)

## 2024-08-06 PROCEDURE — 99284 EMERGENCY DEPT VISIT MOD MDM: CPT

## 2024-08-06 PROCEDURE — 96361 HYDRATE IV INFUSION ADD-ON: CPT

## 2024-08-06 PROCEDURE — 85025 COMPLETE CBC W/AUTO DIFF WBC: CPT

## 2024-08-06 PROCEDURE — 2580000003 HC RX 258: Performed by: EMERGENCY MEDICINE

## 2024-08-06 PROCEDURE — 82550 ASSAY OF CK (CPK): CPT

## 2024-08-06 PROCEDURE — 96360 HYDRATION IV INFUSION INIT: CPT

## 2024-08-06 PROCEDURE — 80053 COMPREHEN METABOLIC PANEL: CPT

## 2024-08-06 RX ORDER — 0.9 % SODIUM CHLORIDE 0.9 %
1000 INTRAVENOUS SOLUTION INTRAVENOUS ONCE
Status: COMPLETED | OUTPATIENT
Start: 2024-08-06 | End: 2024-08-06

## 2024-08-06 RX ORDER — 0.9 % SODIUM CHLORIDE 0.9 %
1000 INTRAVENOUS SOLUTION INTRAVENOUS ONCE
Status: COMPLETED | OUTPATIENT
Start: 2024-08-06 | End: 2024-08-07

## 2024-08-06 RX ADMIN — SODIUM CHLORIDE 1000 ML: 0.9 INJECTION, SOLUTION INTRAVENOUS at 22:53

## 2024-08-06 RX ADMIN — SODIUM CHLORIDE 1000 ML: 9 INJECTION, SOLUTION INTRAVENOUS at 21:30

## 2024-08-07 LAB
ANION GAP SERPL CALCULATED.3IONS-SCNC: 11 MMOL/L (ref 3–16)
BUN SERPL-MCNC: 29 MG/DL (ref 7–20)
CALCIUM SERPL-MCNC: 8 MG/DL (ref 8.3–10.6)
CHLORIDE SERPL-SCNC: 109 MMOL/L (ref 99–110)
CK SERPL-CCNC: 2083 U/L (ref 39–308)
CO2 SERPL-SCNC: 20 MMOL/L (ref 21–32)
CREAT SERPL-MCNC: 1.7 MG/DL (ref 0.9–1.3)
GFR SERPLBLD CREATININE-BSD FMLA CKD-EPI: 46 ML/MIN/{1.73_M2}
GLUCOSE SERPL-MCNC: 103 MG/DL (ref 70–99)
POTASSIUM SERPL-SCNC: 4 MMOL/L (ref 3.5–5.1)
SODIUM SERPL-SCNC: 140 MMOL/L (ref 136–145)

## 2024-08-07 PROCEDURE — 82550 ASSAY OF CK (CPK): CPT

## 2024-08-07 PROCEDURE — 80048 BASIC METABOLIC PNL TOTAL CA: CPT

## 2024-08-07 NOTE — ED PROVIDER NOTES
EMERGENCY DEPARTMENT ENCOUNTER     [facility] EMERGENCY DEPARTMENT     Pt Name: Juan Cantor   MRN: [id number]   Birthdate 1965   Date of evaluation: 8/6/2024   Provider: Clarita Pollock MD   PCP: Romeo Ritchie APRN - NP   Note Started: 5:56 AM EDT 8/7/24     CHIEF COMPLAINT:     Chief Complaint   Patient presents with    Spasms    Shortness of Breath        HISTORY OF PRESENT ILLNESS:  Adult male who comes in complaining of generalized muscle cramps.  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  He tries drinking water but he has noticed his urine is dark.   though he has had muscle cramping in the past this unit appears to be more severe.  This evening he had cramps extending from his legs and extremities including his torso which caused him to feel short of breath.  While this is somewhat subsided the cramping to his left thigh especially laterally continues to bother him which prompted him to come to the emergency room.  He has not had any fevers or chills.  No trauma.  Has had nausea but no vomiting.      PHYSICAL EXAM:    ED Triage Vitals [08/06/24 2054]   BP Temp Temp Source Pulse Respirations SpO2 Height Weight - Scale   118/78 98.2 °F (36.8 °C) Oral 66 18 98 % 1.867 m (6' 1.5\") 96.9 kg (213 lb 10 oz)        Physical Exam  Vitals and nursing note reviewed.   Constitutional:       Appearance: Normal appearance. He is well-developed. He is not ill-appearing.   HENT:      Head: Normocephalic and atraumatic.      Right Ear: External ear normal.      Left Ear: External ear normal.      Nose: Nose normal.   Eyes:      General: No scleral icterus.        Right eye: No discharge.         Left eye: No discharge.      Conjunctiva/sclera: Conjunctivae normal.   Cardiovascular:      Rate and Rhythm: Normal rate and regular rhythm.      Heart sounds: Normal heart sounds.   Pulmonary:      Effort: Pulmonary effort is

## 2024-08-07 NOTE — DISCHARGE INSTRUCTIONS
Continue drinking water and Gatorade's that your urine remains clear.  Return to the emergency room if your symptoms worsen.  You may take Tylenol as needed for pain.  Prompt follow-up with your primary care provider with repeat laboratory studies is important.

## 2024-11-13 ENCOUNTER — OFFICE VISIT (OUTPATIENT)
Dept: ORTHOPEDIC SURGERY | Age: 59
End: 2024-11-13

## 2024-11-13 VITALS — WEIGHT: 213 LBS | BODY MASS INDEX: 27.34 KG/M2 | HEIGHT: 74 IN

## 2024-11-13 DIAGNOSIS — M75.82 ROTATOR CUFF TENDONITIS, LEFT: Primary | ICD-10-CM

## 2024-11-13 DIAGNOSIS — M25.512 ACUTE PAIN OF LEFT SHOULDER: ICD-10-CM

## 2024-11-13 RX ORDER — NAPROXEN 500 MG/1
500 TABLET ORAL 2 TIMES DAILY WITH MEALS
Qty: 60 TABLET | Refills: 0 | Status: SHIPPED | OUTPATIENT
Start: 2024-11-13 | End: 2024-12-13

## 2024-11-13 RX ORDER — TRIAMCINOLONE ACETONIDE 40 MG/ML
40 INJECTION, SUSPENSION INTRA-ARTICULAR; INTRAMUSCULAR ONCE
Status: COMPLETED | OUTPATIENT
Start: 2024-11-13 | End: 2024-11-13

## 2024-11-13 RX ORDER — LIDOCAINE HYDROCHLORIDE 10 MG/ML
4 INJECTION, SOLUTION INFILTRATION; PERINEURAL ONCE
Status: COMPLETED | OUTPATIENT
Start: 2024-11-13 | End: 2024-11-13

## 2024-11-13 RX ADMIN — LIDOCAINE HYDROCHLORIDE 4 ML: 10 INJECTION, SOLUTION INFILTRATION; PERINEURAL at 16:17

## 2024-11-13 RX ADMIN — TRIAMCINOLONE ACETONIDE 40 MG: 40 INJECTION, SUSPENSION INTRA-ARTICULAR; INTRAMUSCULAR at 16:18

## 2024-11-13 NOTE — PROGRESS NOTES
CHIEF COMPLAINT: Left shoulder pain/ cuff tendinopathy/ impingement syndrome.    HISTORY:  Mr. Cantor 59 y.o. male right handed presents today for the first visit for evaluation of left shoulder pain which started Oct 2024.  He is complaining of sharp pain, 9/10.  Pain is increase with elevation and decrease with rest. No radiation and no numbness and tingling sensation. No other complaint.  No h/o trauma or gout.    Past Medical History:   Diagnosis Date    Asthma     Lung cancer (HCC)        No past surgical history on file.    Social History     Socioeconomic History    Marital status: Single     Spouse name: Not on file    Number of children: Not on file    Years of education: Not on file    Highest education level: Not on file   Occupational History    Not on file   Tobacco Use    Smoking status: Former     Types: Cigarettes    Smokeless tobacco: Former   Vaping Use    Vaping status: Never Used   Substance and Sexual Activity    Alcohol use: Yes     Comment: social    Drug use: Not Currently     Types: Marijuana (Weed)    Sexual activity: Not on file   Other Topics Concern    Not on file   Social History Narrative    Not on file     Social Determinants of Health     Financial Resource Strain: Not on File (11/4/2024)    Received from [facility]    Financial Resource Strain     Financial Resource Strain: 0   Food Insecurity: Not on File (11/4/2024)    Received from [facility]    Food Insecurity     Food: 0   Transportation Needs: Not on File (11/4/2024)    Received from [facility]    Transportation Needs     Transportation: 0   Physical Activity: Not on File (11/4/2024)    Received from [facility]    Physical Activity     Physical Activity: 0   Stress: Not on File (11/4/2024)    Received from [facility]    Stress     Stress: 0   Social Connections: Not on File (11/4/2024)    Received from [facility]    Social Connections     Connectedness: 0   Intimate Partner Violence: Unknown (1/20/2024)    Received from [facility] and [facility]

## 2024-12-15 ENCOUNTER — HOSPITAL ENCOUNTER (EMERGENCY)
Age: 59
Discharge: HOME | End: 2024-12-15
Attending: EMERGENCY MEDICINE
Payer: COMMERCIAL

## 2024-12-15 VITALS
TEMPERATURE: 97.9 F | WEIGHT: 210.8 LBS | DIASTOLIC BLOOD PRESSURE: 107 MMHG | SYSTOLIC BLOOD PRESSURE: 161 MMHG | RESPIRATION RATE: 16 BRPM | OXYGEN SATURATION: 99 % | BODY MASS INDEX: 27.94 KG/M2 | HEART RATE: 81 BPM | HEIGHT: 73 IN

## 2024-12-15 DIAGNOSIS — R07.89 ATYPICAL CHEST PAIN: Primary | ICD-10-CM

## 2024-12-15 DIAGNOSIS — R05.1 ACUTE COUGH: ICD-10-CM

## 2024-12-15 PROCEDURE — 6370000000 HC RX 637 (ALT 250 FOR IP): Performed by: EMERGENCY MEDICINE

## 2024-12-15 PROCEDURE — 99283 EMERGENCY DEPT VISIT LOW MDM: CPT

## 2024-12-15 RX ORDER — AZITHROMYCIN 250 MG/1
250 TABLET, FILM COATED ORAL SEE ADMIN INSTRUCTIONS
Qty: 6 TABLET | Refills: 0 | Status: SHIPPED | OUTPATIENT
Start: 2024-12-15 | End: 2024-12-20

## 2024-12-15 RX ORDER — PREDNISONE 20 MG/1
40 TABLET ORAL DAILY
Qty: 20 TABLET | Refills: 0 | Status: SHIPPED | OUTPATIENT
Start: 2024-12-15 | End: 2024-12-25

## 2024-12-15 RX ORDER — ALBUTEROL SULFATE 90 UG/1
2 INHALANT RESPIRATORY (INHALATION) 4 TIMES DAILY PRN
Qty: 18 G | Refills: 1 | Status: SHIPPED | OUTPATIENT
Start: 2024-12-15

## 2024-12-15 RX ORDER — PREDNISONE 20 MG/1
20 TABLET ORAL ONCE
Status: COMPLETED | OUTPATIENT
Start: 2024-12-15 | End: 2024-12-15

## 2024-12-15 RX ORDER — METHOCARBAMOL 500 MG/1
500 TABLET, FILM COATED ORAL 4 TIMES DAILY
Qty: 40 TABLET | Refills: 0 | Status: SHIPPED | OUTPATIENT
Start: 2024-12-15 | End: 2024-12-25

## 2024-12-15 RX ORDER — AZITHROMYCIN 250 MG/1
500 TABLET, FILM COATED ORAL ONCE
Status: COMPLETED | OUTPATIENT
Start: 2024-12-15 | End: 2024-12-15

## 2024-12-15 RX ORDER — ONDANSETRON 4 MG/1
4 TABLET, ORALLY DISINTEGRATING ORAL ONCE
Status: COMPLETED | OUTPATIENT
Start: 2024-12-15 | End: 2024-12-15

## 2024-12-15 RX ADMIN — ONDANSETRON 4 MG: 4 TABLET, ORALLY DISINTEGRATING ORAL at 13:54

## 2024-12-15 RX ADMIN — AZITHROMYCIN 500 MG: 250 TABLET, FILM COATED ORAL at 13:54

## 2024-12-15 RX ADMIN — PREDNISONE 20 MG: 20 TABLET ORAL at 13:54

## 2024-12-15 ASSESSMENT — PAIN DESCRIPTION - FREQUENCY: FREQUENCY: CONTINUOUS

## 2024-12-15 ASSESSMENT — PAIN SCALES - GENERAL: PAINLEVEL_OUTOF10: 8

## 2024-12-15 ASSESSMENT — PAIN - FUNCTIONAL ASSESSMENT
PAIN_FUNCTIONAL_ASSESSMENT: ACTIVITIES ARE NOT PREVENTED
PAIN_FUNCTIONAL_ASSESSMENT: 0-10

## 2024-12-15 ASSESSMENT — PAIN DESCRIPTION - LOCATION: LOCATION: GENERALIZED

## 2024-12-15 ASSESSMENT — PAIN DESCRIPTION - ONSET: ONSET: ON-GOING

## 2024-12-15 ASSESSMENT — PAIN DESCRIPTION - DESCRIPTORS: DESCRIPTORS: ACHING

## 2024-12-15 NOTE — ED PROVIDER NOTES
MG tablet Take 1 tablet by mouth See Admin Instructions for 5 days 500mg on day 1 followed by 250mg on days 2 - 5 6 tablet 0    methocarbamol (ROBAXIN) 500 MG tablet Take 1 tablet by mouth 4 times daily for 10 days 40 tablet 0    albuterol sulfate HFA (VENTOLIN HFA) 108 (90 Base) MCG/ACT inhaler Inhale 2 puffs into the lungs 4 times daily as needed for Wheezing 18 g 1    naproxen (NAPROSYN) 500 MG tablet Take 1 tablet by mouth 2 times daily (with meals) 60 tablet 0    rosuvastatin (CRESTOR) 40 MG tablet Take 1 tablet by mouth nightly 30 tablet 3    clopidogrel (PLAVIX) 75 MG tablet Take 1 tablet by mouth daily 30 tablet 3    aspirin (MARIA ESTHER ASPIRIN) 325 MG tablet Take 1 tablet by mouth daily 30 tablet 3    amLODIPine (NORVASC) 10 MG tablet Take 1 tablet by mouth daily      losartan-hydroCHLOROthiazide (HYZAAR) 100-12.5 MG per tablet Take 1 tablet by mouth daily      albuterol (PROVENTIL) (5 MG/ML) 0.5% nebulizer solution Take 1 mL by nebulization 4 times daily as needed for Wheezing 120 each 3    albuterol sulfate HFA (PROAIR HFA) 108 (90 Base) MCG/ACT inhaler Inhale 2 puffs into the lungs every 6 hours as needed for Wheezing 18 g 3     Allergies   Allergen Reactions    Atorvastatin Myalgia    Ibuprofen      Patient tolerates, but told to avoid 2/2 possible kidney damage        Nursing Notes Reviewed    Physical Exam:  Triage VS:    ED Triage Vitals [12/15/24 1337]   Encounter Vitals Group      BP (!) 161/107      Systolic BP Percentile       Diastolic BP Percentile       Pulse 81      Respirations 16      Temp 97.9 °F (36.6 °C)      Temp Source Oral      SpO2 99 %      Weight - Scale 95.6 kg (210 lb 12.8 oz)      Height 1.854 m (6' 1\")      Head Circumference       Peak Flow       Pain Score       Pain Loc       Pain Education       Exclude from Growth Chart        My pulse ox interpretation is - normal    General appearance:  No acute distress.   Skin:  Warm. Dry.   Eye:  Extraocular movements intact.     Ears,

## 2024-12-18 ENCOUNTER — HOSPITAL ENCOUNTER (EMERGENCY)
Age: 59
Discharge: HOME | End: 2024-12-18
Attending: EMERGENCY MEDICINE
Payer: COMMERCIAL

## 2024-12-18 ENCOUNTER — APPOINTMENT (OUTPATIENT)
Dept: GENERAL RADIOLOGY | Age: 59
End: 2024-12-18
Payer: COMMERCIAL

## 2024-12-18 VITALS
WEIGHT: 208.6 LBS | RESPIRATION RATE: 19 BRPM | HEIGHT: 73 IN | OXYGEN SATURATION: 99 % | SYSTOLIC BLOOD PRESSURE: 134 MMHG | HEART RATE: 71 BPM | TEMPERATURE: 98.9 F | DIASTOLIC BLOOD PRESSURE: 84 MMHG | BODY MASS INDEX: 27.65 KG/M2

## 2024-12-18 DIAGNOSIS — R05.3 CHRONIC COUGH: Primary | ICD-10-CM

## 2024-12-18 LAB
ALBUMIN SERPL-MCNC: 4.4 G/DL (ref 3.4–5)
ALBUMIN/GLOB SERPL: 1.8 {RATIO} (ref 1.1–2.2)
ALP SERPL-CCNC: 60 U/L (ref 40–129)
ALT SERPL-CCNC: 24 U/L (ref 10–40)
ANION GAP SERPL CALCULATED.3IONS-SCNC: 14 MMOL/L (ref 3–16)
AST SERPL-CCNC: 25 U/L (ref 15–37)
BASOPHILS # BLD: 0.1 K/UL (ref 0–0.2)
BASOPHILS NFR BLD: 1.1 %
BILIRUB SERPL-MCNC: <0.2 MG/DL (ref 0–1)
BUN SERPL-MCNC: 27 MG/DL (ref 7–20)
CALCIUM SERPL-MCNC: 8.9 MG/DL (ref 8.3–10.6)
CHLORIDE SERPL-SCNC: 101 MMOL/L (ref 99–110)
CO2 SERPL-SCNC: 24 MMOL/L (ref 21–32)
CREAT SERPL-MCNC: 2 MG/DL (ref 0.9–1.3)
DEPRECATED RDW RBC AUTO: 15.2 % (ref 12.4–15.4)
EOSINOPHIL # BLD: 0.1 K/UL (ref 0–0.6)
EOSINOPHIL NFR BLD: 0.6 %
FLUAV RNA UPPER RESP QL NAA+PROBE: NEGATIVE
FLUBV AG NPH QL: NEGATIVE
GFR SERPLBLD CREATININE-BSD FMLA CKD-EPI: 38 ML/MIN/{1.73_M2}
GLUCOSE SERPL-MCNC: 98 MG/DL (ref 70–99)
HCT VFR BLD AUTO: 43.6 % (ref 40.5–52.5)
HGB BLD-MCNC: 14.1 G/DL (ref 13.5–17.5)
LYMPHOCYTES # BLD: 2.1 K/UL (ref 1–5.1)
LYMPHOCYTES NFR BLD: 17.1 %
MCH RBC QN AUTO: 27.3 PG (ref 26–34)
MCHC RBC AUTO-ENTMCNC: 32.3 G/DL (ref 31–36)
MCV RBC AUTO: 84.5 FL (ref 80–100)
MONOCYTES # BLD: 1 K/UL (ref 0–1.3)
MONOCYTES NFR BLD: 8.1 %
NEUTROPHILS # BLD: 8.9 K/UL (ref 1.7–7.7)
NEUTROPHILS NFR BLD: 73.1 %
NT-PROBNP SERPL-MCNC: 49 PG/ML (ref 0–124)
PLATELET # BLD AUTO: 210 K/UL (ref 135–450)
PMV BLD AUTO: 9.1 FL (ref 5–10.5)
POTASSIUM SERPL-SCNC: 4 MMOL/L (ref 3.5–5.1)
PROT SERPL-MCNC: 6.8 G/DL (ref 6.4–8.2)
RBC # BLD AUTO: 5.16 M/UL (ref 4.2–5.9)
SARS-COV-2 RDRP RESP QL NAA+PROBE: NOT DETECTED
SODIUM SERPL-SCNC: 139 MMOL/L (ref 136–145)
TROPONIN, HIGH SENSITIVITY: 9 NG/L (ref 0–22)
WBC # BLD AUTO: 12.2 K/UL (ref 4–11)

## 2024-12-18 PROCEDURE — 85025 COMPLETE CBC W/AUTO DIFF WBC: CPT

## 2024-12-18 PROCEDURE — 71045 X-RAY EXAM CHEST 1 VIEW: CPT

## 2024-12-18 PROCEDURE — 80053 COMPREHEN METABOLIC PANEL: CPT

## 2024-12-18 PROCEDURE — 84484 ASSAY OF TROPONIN QUANT: CPT

## 2024-12-18 PROCEDURE — 87804 INFLUENZA ASSAY W/OPTIC: CPT

## 2024-12-18 PROCEDURE — 99285 EMERGENCY DEPT VISIT HI MDM: CPT

## 2024-12-18 PROCEDURE — 83880 ASSAY OF NATRIURETIC PEPTIDE: CPT

## 2024-12-18 PROCEDURE — 93005 ELECTROCARDIOGRAM TRACING: CPT | Performed by: EMERGENCY MEDICINE

## 2024-12-18 PROCEDURE — 6370000000 HC RX 637 (ALT 250 FOR IP): Performed by: EMERGENCY MEDICINE

## 2024-12-18 PROCEDURE — 87635 SARS-COV-2 COVID-19 AMP PRB: CPT

## 2024-12-18 RX ORDER — IPRATROPIUM BROMIDE AND ALBUTEROL SULFATE 2.5; .5 MG/3ML; MG/3ML
1 SOLUTION RESPIRATORY (INHALATION) ONCE
Status: COMPLETED | OUTPATIENT
Start: 2024-12-18 | End: 2024-12-18

## 2024-12-18 RX ORDER — BENZONATATE 100 MG/1
100 CAPSULE ORAL 3 TIMES DAILY PRN
Qty: 30 CAPSULE | Refills: 0 | Status: SHIPPED | OUTPATIENT
Start: 2024-12-18 | End: 2024-12-28

## 2024-12-18 RX ADMIN — IPRATROPIUM BROMIDE AND ALBUTEROL SULFATE 1 DOSE: 2.5; .5 SOLUTION RESPIRATORY (INHALATION) at 20:26

## 2024-12-18 ASSESSMENT — PAIN - FUNCTIONAL ASSESSMENT
PAIN_FUNCTIONAL_ASSESSMENT: ACTIVITIES ARE NOT PREVENTED
PAIN_FUNCTIONAL_ASSESSMENT: 0-10

## 2024-12-18 ASSESSMENT — PAIN DESCRIPTION - FREQUENCY: FREQUENCY: CONTINUOUS

## 2024-12-18 ASSESSMENT — PAIN DESCRIPTION - ORIENTATION: ORIENTATION: MID

## 2024-12-18 ASSESSMENT — PAIN DESCRIPTION - DESCRIPTORS: DESCRIPTORS: SORE;TIGHTNESS

## 2024-12-18 ASSESSMENT — PAIN DESCRIPTION - ONSET: ONSET: ON-GOING

## 2024-12-18 ASSESSMENT — PAIN DESCRIPTION - PAIN TYPE: TYPE: ACUTE PAIN

## 2024-12-18 ASSESSMENT — PAIN SCALES - GENERAL: PAINLEVEL_OUTOF10: 8

## 2024-12-18 ASSESSMENT — PAIN DESCRIPTION - LOCATION: LOCATION: CHEST

## 2024-12-19 LAB
EKG ATRIAL RATE: 66 BPM
EKG DIAGNOSIS: NORMAL
EKG P AXIS: 49 DEGREES
EKG P-R INTERVAL: 152 MS
EKG Q-T INTERVAL: 394 MS
EKG QRS DURATION: 94 MS
EKG QTC CALCULATION (BAZETT): 413 MS
EKG R AXIS: 51 DEGREES
EKG T AXIS: 4 DEGREES
EKG VENTRICULAR RATE: 66 BPM

## 2024-12-19 PROCEDURE — 93010 ELECTROCARDIOGRAM REPORT: CPT | Performed by: INTERNAL MEDICINE

## 2024-12-19 NOTE — DISCHARGE INSTRUCTIONS
Discharge home  Continue taking your medications  Add Tessalon Perles  Follow-up with your family doctor  Follow-up with your kidney doctor

## 2024-12-19 NOTE — ED PROVIDER NOTES
[facility] EMERGENCY DEPARTMENT  eMERGENCY dEPARTMENT eNCOUnter      Pt Name: Juan Cantor  MRN: [id number]  Birthdate 1965  Date of evaluation: 12/18/2024  Provider: Duarte Morin MD  PCP: Romeo Ritchie, APRN - NP      CHIEF COMPLAINT       Chief Complaint   Patient presents with    Shortness of Breath    Cough       HISTORY OFPRESENT ILLNESS   (Location/Symptom, Timing/Onset, Context/Setting, Quality, Duration, Modifying Factors,Severity)  Note limiting factors.     Juan Cantor is a 59 y.o. male presents with complaints of cough and shortness of breath he was actually seen here about 4 5 days ago for the same placed on Zithromax and prednisone he has a history of asthma apparently also history of lung cancer has been using his inhalers his cough is productive of sputum that is clear he denies any fever or chest pain    Nursing Notes were all reviewed and agreed with or any disagreements were addressed  in the HPI.    REVIEW OF SYSTEMS    (2-9 systems for level 4, 10 or more for level 5)     Review of Systems    Positives and Pertinent negatives as per HPI.  Except as noted above in the ROS, all other systems were reviewed andnegative.       PASTMEDICAL HISTORY     Past Medical History:   Diagnosis Date    Asthma     Cerebral artery occlusion with cerebral infarction (HCC)     Lung cancer (HCC)          SURGICAL HISTORY     History reviewed. No pertinent surgical history.      CURRENT MEDICATIONS       Previous Medications    ALBUTEROL (PROVENTIL) (5 MG/ML) 0.5% NEBULIZER SOLUTION    Take 1 mL by nebulization 4 times daily as needed for Wheezing    ALBUTEROL SULFATE HFA (PROAIR HFA) 108 (90 BASE) MCG/ACT INHALER    Inhale 2 puffs into the lungs every 6 hours as needed for Wheezing    ALBUTEROL SULFATE HFA (VENTOLIN HFA) 108 (90 BASE) MCG/ACT INHALER    Inhale 2 puffs into the lungs 4 times daily as needed for Wheezing    AMLODIPINE (NORVASC) 10 MG TABLET    Take 1 tablet by mouth

## 2024-12-19 NOTE — ED NOTES
59 y.o. male to ED with c/o cough and shortness of breath. Pt was recently seen on 12/15/24, but says his cough has worsened. Pt remains on RA.

## 2025-01-08 ENCOUNTER — TELEPHONE (OUTPATIENT)
Dept: ORTHOPEDIC SURGERY | Age: 60
End: 2025-01-08

## 2025-01-08 ENCOUNTER — OFFICE VISIT (OUTPATIENT)
Dept: ORTHOPEDIC SURGERY | Age: 60
End: 2025-01-08

## 2025-01-08 VITALS — BODY MASS INDEX: 27.57 KG/M2 | WEIGHT: 208 LBS | HEIGHT: 73 IN

## 2025-01-08 DIAGNOSIS — M17.11 OSTEOARTHRITIS OF RIGHT KNEE, UNSPECIFIED OSTEOARTHRITIS TYPE: ICD-10-CM

## 2025-01-08 DIAGNOSIS — M25.561 RIGHT KNEE PAIN, UNSPECIFIED CHRONICITY: Primary | ICD-10-CM

## 2025-01-08 RX ORDER — LIDOCAINE HYDROCHLORIDE 10 MG/ML
4 INJECTION, SOLUTION INFILTRATION; PERINEURAL ONCE
Status: COMPLETED | OUTPATIENT
Start: 2025-01-08 | End: 2025-01-08

## 2025-01-08 RX ORDER — TRIAMCINOLONE ACETONIDE 40 MG/ML
40 INJECTION, SUSPENSION INTRA-ARTICULAR; INTRAMUSCULAR ONCE
Status: COMPLETED | OUTPATIENT
Start: 2025-01-08 | End: 2025-01-08

## 2025-01-08 RX ADMIN — LIDOCAINE HYDROCHLORIDE 4 ML: 10 INJECTION, SOLUTION INFILTRATION; PERINEURAL at 13:32

## 2025-01-08 RX ADMIN — TRIAMCINOLONE ACETONIDE 40 MG: 40 INJECTION, SUSPENSION INTRA-ARTICULAR; INTRAMUSCULAR at 13:32

## 2025-01-31 ENCOUNTER — HOSPITAL ENCOUNTER (EMERGENCY)
Age: 60
Discharge: HOME | End: 2025-01-31
Attending: EMERGENCY MEDICINE
Payer: COMMERCIAL

## 2025-01-31 VITALS
HEIGHT: 73 IN | BODY MASS INDEX: 27.51 KG/M2 | HEART RATE: 77 BPM | DIASTOLIC BLOOD PRESSURE: 81 MMHG | WEIGHT: 207.6 LBS | SYSTOLIC BLOOD PRESSURE: 145 MMHG | OXYGEN SATURATION: 98 % | RESPIRATION RATE: 16 BRPM | TEMPERATURE: 99.4 F

## 2025-01-31 DIAGNOSIS — M25.512 LEFT SHOULDER PAIN, UNSPECIFIED CHRONICITY: Primary | ICD-10-CM

## 2025-01-31 PROCEDURE — 99283 EMERGENCY DEPT VISIT LOW MDM: CPT

## 2025-01-31 RX ORDER — METHOCARBAMOL 750 MG/1
750 TABLET, FILM COATED ORAL 4 TIMES DAILY
Qty: 40 TABLET | Refills: 0 | Status: SHIPPED | OUTPATIENT
Start: 2025-01-31 | End: 2025-02-10

## 2025-01-31 RX ORDER — NAPROXEN 500 MG/1
500 TABLET ORAL 2 TIMES DAILY WITH MEALS
Qty: 10 TABLET | Refills: 0 | Status: SHIPPED | OUTPATIENT
Start: 2025-01-31

## 2025-01-31 ASSESSMENT — PAIN DESCRIPTION - LOCATION: LOCATION: SHOULDER

## 2025-01-31 ASSESSMENT — LIFESTYLE VARIABLES
HOW OFTEN DO YOU HAVE A DRINK CONTAINING ALCOHOL: 2-4 TIMES A MONTH
HOW MANY STANDARD DRINKS CONTAINING ALCOHOL DO YOU HAVE ON A TYPICAL DAY: 3 OR 4

## 2025-01-31 ASSESSMENT — PAIN DESCRIPTION - DESCRIPTORS: DESCRIPTORS: DISCOMFORT

## 2025-01-31 ASSESSMENT — PAIN DESCRIPTION - ORIENTATION: ORIENTATION: LEFT

## 2025-01-31 ASSESSMENT — PAIN SCALES - GENERAL: PAINLEVEL_OUTOF10: 9

## 2025-01-31 NOTE — ED PROVIDER NOTES
Emergency Department Encounter    Patient: Juan Cantor  MRN: [id number]  : 1965  Date of Evaluation: 2025  ED Provider:  Kandi Henriquez MD    Triage Chief Complaint:   Shoulder Pain (Pt c/o left shoulder pain for the past few days. Does not disclose any trauma )    [place]:  Juan Cantor is a 59 y.o. male that presents with complaint of left shoulder pain.  He picked up a small bike, and had been having pain over the last 3 days.  He notes that he had some stiffness at the shoulder a couple of weeks ago, saw his orthopedist for his right knee.  And had asked if he could have a repeat injection in that shoulder because it was stiff and aching some, and they told him he had to wait because he had an injection in November.  He has not fallen or had a new injury or trauma.  Denies fevers, swelling of the joint.  Denies chest pain and shortness of breath.  No pain with breathing.  It hurts when he is moving the shoulder but he is able to do so and able to lift his arm above his head.  It is worse when he lifts it up above his head.  He has no numbness and tingling in the hand or arm.  He has no pain at the elbow or the wrist.    ROS - see HPI, below listed is current ROS at time of my eval:  10 systems reviewed and negative except as above.     Past Medical History:   Diagnosis Date    Asthma     Cerebral artery occlusion with cerebral infarction (HCC)     Lung cancer (HCC)      No past surgical history on file.  No family history on file.  Social History     Socioeconomic History    Marital status: Single     Spouse name: Not on file    Number of children: Not on file    Years of education: Not on file    Highest education level: Not on file   Occupational History    Not on file   Tobacco Use    Smoking status: Former     Types: Cigarettes    Smokeless tobacco: Former   Vaping Use    Vaping status: Never Used   Substance and Sexual Activity    Alcohol use: Yes     Comment: social    Drug use: Not

## 2025-03-04 ENCOUNTER — TELEPHONE (OUTPATIENT)
Dept: ORTHOPEDIC SURGERY | Age: 60
End: 2025-03-04

## 2025-03-04 ENCOUNTER — OFFICE VISIT (OUTPATIENT)
Dept: ORTHOPEDIC SURGERY | Age: 60
End: 2025-03-04
Payer: COMMERCIAL

## 2025-03-04 VITALS — HEIGHT: 73 IN | WEIGHT: 207.67 LBS | BODY MASS INDEX: 27.52 KG/M2

## 2025-03-04 DIAGNOSIS — M75.82 ROTATOR CUFF TENDONITIS, LEFT: Primary | ICD-10-CM

## 2025-03-04 PROCEDURE — 1036F TOBACCO NON-USER: CPT | Performed by: NURSE PRACTITIONER

## 2025-03-04 PROCEDURE — 99214 OFFICE O/P EST MOD 30 MIN: CPT | Performed by: NURSE PRACTITIONER

## 2025-03-04 PROCEDURE — G8419 CALC BMI OUT NRM PARAM NOF/U: HCPCS | Performed by: NURSE PRACTITIONER

## 2025-03-04 PROCEDURE — G8427 DOCREV CUR MEDS BY ELIG CLIN: HCPCS | Performed by: NURSE PRACTITIONER

## 2025-03-04 PROCEDURE — 3017F COLORECTAL CA SCREEN DOC REV: CPT | Performed by: NURSE PRACTITIONER

## 2025-03-04 RX ORDER — PREDNISONE 10 MG/1
TABLET ORAL
Qty: 26 TABLET | Refills: 0 | Status: SHIPPED | OUTPATIENT
Start: 2025-03-04

## 2025-03-04 NOTE — PROGRESS NOTES
CHIEF COMPLAINT: Left shoulder pain/ cuff tendinopathy/ impingement syndrome.    HISTORY:  Juan Cantor 59 y.o. male right handed presents today for follow up visit for evaluation of left shoulder pain which started Oct 2024.  He is complaining of sharp pain, 11/10 and worsening.   Pain is increase with elevation and decrease with rest. He reports that this intermittently radiates down the shoulder and into the hand. Denies numbness and tingling sensation. He had a Cortisone injection in the left shoulder on 11/13/2024 with couple weeks relief and then the pain returned and is worse in severity and waking him up at night. He has a h/o cervical spine surgery 8 years ago. He presented to [facility] on 1/31/2025 with left shoulder pain and was sent Robaxin with no improvement. No other complaint.  No h/o trauma or gout.    Past Medical History:   Diagnosis Date    Asthma     Cerebral artery occlusion with cerebral infarction (HCC)     Lung cancer (HCC)      No past surgical history on file.  No family history on file.  Social History     Socioeconomic History    Marital status: Single     Spouse name: Not on file    Number of children: Not on file    Years of education: Not on file    Highest education level: Not on file   Occupational History    Not on file   Tobacco Use    Smoking status: Former     Types: Cigarettes    Smokeless tobacco: Former   Vaping Use    Vaping status: Never Used   Substance and Sexual Activity    Alcohol use: Yes     Comment: social    Drug use: Not Currently     Types: Marijuana (Weed)    Sexual activity: Not on file   Other Topics Concern    Not on file   Social History Narrative    Not on file     Social Determinants of Health     Financial Resource Strain: Not on File (11/4/2024)    Received from [employer]    Financial Resource Strain     Financial Resource Strain: 0   Food Insecurity: Not on File (11/4/2024)    Received from [employer]    Food Insecurity     Food: 0   Transportation Needs: Not

## 2025-04-03 ENCOUNTER — HOSPITAL ENCOUNTER (OUTPATIENT)
Dept: MRI IMAGING | Age: 60
Discharge: HOME | End: 2025-04-03
Payer: COMMERCIAL

## 2025-04-03 DIAGNOSIS — M75.82 ROTATOR CUFF TENDONITIS, LEFT: ICD-10-CM

## 2025-04-03 PROCEDURE — 73221 MRI JOINT UPR EXTREM W/O DYE: CPT

## 2025-04-11 ENCOUNTER — TELEPHONE (OUTPATIENT)
Dept: ORTHOPEDIC SURGERY | Age: 60
End: 2025-04-11

## 2025-04-11 NOTE — TELEPHONE ENCOUNTER
SW patient. Advised patient he needs to schedule a FU with Dr. Gonzalez for TR. Scheduled for 4/15/25 at  at 4:00.

## 2025-04-15 ENCOUNTER — OFFICE VISIT (OUTPATIENT)
Dept: ORTHOPEDIC SURGERY | Age: 60
End: 2025-04-15
Payer: COMMERCIAL

## 2025-04-15 VITALS — HEIGHT: 72 IN | BODY MASS INDEX: 28.04 KG/M2 | WEIGHT: 207 LBS

## 2025-04-15 DIAGNOSIS — M75.102 TEAR OF LEFT ROTATOR CUFF, UNSPECIFIED TEAR EXTENT, UNSPECIFIED WHETHER TRAUMATIC: Primary | ICD-10-CM

## 2025-04-15 PROCEDURE — G8419 CALC BMI OUT NRM PARAM NOF/U: HCPCS | Performed by: ORTHOPAEDIC SURGERY

## 2025-04-15 PROCEDURE — G8427 DOCREV CUR MEDS BY ELIG CLIN: HCPCS | Performed by: ORTHOPAEDIC SURGERY

## 2025-04-15 PROCEDURE — 1036F TOBACCO NON-USER: CPT | Performed by: ORTHOPAEDIC SURGERY

## 2025-04-15 PROCEDURE — 3017F COLORECTAL CA SCREEN DOC REV: CPT | Performed by: ORTHOPAEDIC SURGERY

## 2025-04-15 PROCEDURE — 99214 OFFICE O/P EST MOD 30 MIN: CPT | Performed by: ORTHOPAEDIC SURGERY

## 2025-04-15 RX ORDER — MELOXICAM 7.5 MG/1
7.5 TABLET ORAL DAILY
Qty: 30 TABLET | Refills: 0 | Status: SHIPPED | OUTPATIENT
Start: 2025-04-15 | End: 2025-05-15

## 2025-04-15 NOTE — PROGRESS NOTES
CHIEF COMPLAINT: Left shoulder pain/ cuff tendinopathy/ impingement syndrome.    HISTORY:  Juan Cantor 59 y.o. male right handed presents today for follow up visit for evaluation of left shoulder pain which started Oct 2024.  He is complaining of sharp pain, 9/10 and worsening.   Pain is increase with elevation and decrease with rest. He reports that this intermittently radiates down the shoulder and into the hand. Denies numbness and tingling sensation. He had a Cortisone injection in the left shoulder on 11/13/2024 with couple weeks relief and then the pain returned and is worse in severity and waking him up at night. He has a h/o cervical spine surgery 8 years ago. He presented to [facility] on 1/31/2025 with left shoulder pain and was sent Robaxin with no improvement. No other complaint.  No h/o trauma or gout.    Past Medical History:   Diagnosis Date    Asthma     Cerebral artery occlusion with cerebral infarction (HCC)     Lung cancer (HCC)        No past surgical history on file.    Social History     Socioeconomic History    Marital status: Single     Spouse name: Not on file    Number of children: Not on file    Years of education: Not on file    Highest education level: Not on file   Occupational History    Not on file   Tobacco Use    Smoking status: Former     Types: Cigarettes    Smokeless tobacco: Former   Vaping Use    Vaping status: Never Used   Substance and Sexual Activity    Alcohol use: Yes     Comment: social    Drug use: Not Currently     Types: Marijuana (Weed)    Sexual activity: Not on file   Other Topics Concern    Not on file   Social History Narrative    Not on file     Social Drivers of Health     Financial Resource Strain: Not on File (11/4/2024)    Received from [employer]    Financial Resource Strain     Financial Resource Strain: 0   Food Insecurity: Not on File (11/4/2024)    Received from [employer]    Food Insecurity     Food: 0   Transportation Needs: Not on File (11/4/2024)    Received

## 2025-04-17 ENCOUNTER — OFFICE VISIT (OUTPATIENT)
Dept: ORTHOPEDIC SURGERY | Age: 60
End: 2025-04-17

## 2025-04-17 ENCOUNTER — PREP FOR PROCEDURE (OUTPATIENT)
Dept: ORTHOPEDIC SURGERY | Age: 60
End: 2025-04-17

## 2025-04-17 VITALS — BODY MASS INDEX: 28.44 KG/M2 | WEIGHT: 210 LBS | HEIGHT: 72 IN

## 2025-04-17 DIAGNOSIS — M19.012 ARTHRITIS OF LEFT ACROMIOCLAVICULAR JOINT: ICD-10-CM

## 2025-04-17 DIAGNOSIS — M75.122 COMPLETE TEAR OF LEFT ROTATOR CUFF, UNSPECIFIED WHETHER TRAUMATIC: Primary | ICD-10-CM

## 2025-04-17 NOTE — PROGRESS NOTES
CHIEF COMPLAINT: Left shoulder pain    History:    Juan Cantor is a 59 y.o. right handed male referred by Kwabena Gonzalez MD for evaluation and treatment of Left shoulder pain.   This is evaluated as a personal injury.   The pain began 7 months ago.  Pain is rated as a 10/10.   There was not an injury.  Pain is located laterally.  Symptoms are worse with laying down, overhead activity, abduction, and lifting things.  The patient has not had PT. The patient has had an injection with some relief.   The patient has tried NSAIDs, without relief.   He states that licker improves his symptoms.  He states he drinks maybe up to a pint per night.  Patient's occupation is [employer]      Past Medical History:   Diagnosis Date    Asthma     Cerebral artery occlusion with cerebral infarction (HCC)     Lung cancer (HCC)        No past surgical history on file.    Current Outpatient Medications on File Prior to Visit   Medication Sig Dispense Refill    meloxicam (MOBIC) 7.5 MG tablet Take 1 tablet by mouth daily 30 tablet 0    predniSONE (DELTASONE) 10 MG tablet Sig:3pillspoBID x2days, then 2pillspoBID x2days, then 1pillpoBID x2 days, then 1pillpo QD. 26 tablet 0    naproxen (NAPROSYN) 500 MG tablet Take 1 tablet by mouth 2 times daily (with meals) 10 tablet 0    rosuvastatin (CRESTOR) 40 MG tablet TAKE 1 TABLET BY MOUTH EVERY NIGHT 30 tablet 0    albuterol sulfate HFA (VENTOLIN HFA) 108 (90 Base) MCG/ACT inhaler Inhale 2 puffs into the lungs 4 times daily as needed for Wheezing 18 g 1    clopidogrel (PLAVIX) 75 MG tablet Take 1 tablet by mouth daily 30 tablet 3    aspirin (MARIA ESTHER ASPIRIN) 325 MG tablet Take 1 tablet by mouth daily 30 tablet 3    amLODIPine (NORVASC) 10 MG tablet Take 1 tablet by mouth daily      losartan-hydroCHLOROthiazide (HYZAAR) 100-12.5 MG per tablet Take 1 tablet by mouth daily      albuterol (PROVENTIL) (5 MG/ML) 0.5% nebulizer solution Take 1 mL by nebulization 4 times daily as

## 2025-04-21 RX ORDER — SODIUM CHLORIDE 0.9 % (FLUSH) 0.9 %
5-40 SYRINGE (ML) INJECTION PRN
Status: CANCELLED | OUTPATIENT
Start: 2025-04-21

## 2025-04-21 RX ORDER — SODIUM CHLORIDE 9 MG/ML
INJECTION, SOLUTION INTRAVENOUS PRN
Status: CANCELLED | OUTPATIENT
Start: 2025-04-21

## 2025-04-21 RX ORDER — SODIUM CHLORIDE 0.9 % (FLUSH) 0.9 %
5-40 SYRINGE (ML) INJECTION EVERY 12 HOURS SCHEDULED
Status: CANCELLED | OUTPATIENT
Start: 2025-04-21

## 2025-04-22 ENCOUNTER — TELEPHONE (OUTPATIENT)
Dept: ORTHOPEDIC SURGERY | Age: 60
End: 2025-04-22

## 2025-04-22 NOTE — TELEPHONE ENCOUNTER
Left voicemail message for patient to call back   Patient states he sees  Cardiology-trying sending a cardiac clearance letter but was told he has not been seen in the office.  We need to know if he seen any cardiologist in the last year   Surgery is scheduled for 5/8/25

## 2025-04-22 NOTE — TELEPHONE ENCOUNTER
Spoke with patient, he states he does not have a cardiologist.  Patient states he had a stent put into his neck but could not remember the surgeon. Looking back into records I located the information.  Dr Eron Martel with [facility] ([phone]) performed carotid angioplasty and stent placement in Feb 2024.  I left a message for his assistant regarding clearance and recommendation on stopping Plavix and Aspirin before the surgery on 5/8/25

## 2025-04-23 NOTE — TELEPHONE ENCOUNTER
Called Dr Ritchie's office (PCP) [phone] to have patient scheduled for a pre-op.  Also, Dr Ritchie prescribes the Plavix so we need to know if the patient can stop this and for how long before surgery

## 2025-04-23 NOTE — TELEPHONE ENCOUNTER
Per Agata with Dr Martel's office they do not prescribe any medications for this patient.  Patient has not followed up with the office after the procedure either.

## 2025-04-24 NOTE — TELEPHONE ENCOUNTER
Per Dr Ritchie's office patient can stop the Plavix 7 days prior to surgery.  Dr Ritchie states patient needs to see Dr Martel for stent placement follow up and clearance

## 2025-04-30 NOTE — PROGRESS NOTES
DATE 5/8/2025____ PLACE ____  [address]       TIME ____                                   xxxxx____  [address]      ARRIVAL TIME ___                                                                             SURGEONS OFFICE TO GIVE ARRIVAL TIME _per office__                                    IF YOU HAVE NOT RECEIVED A TIME CONTACT YOUR SURGEON                                     THE FOLLOWING THINGS NEED TO BE DONE OR YOUR SURGERY WILL BE CANCELLED    NOTHING TO EAT OR DRINK AFTER MIDNIGHT THE NIGHT BEFORE. YOU MAY TAKE ONLY THE FOLLOWING MEDICATIONS WITH A SIP OF WATER ON THE MORNING OF YOUR SURGERY.  __amlodipine, stomach pill, inhaler____________________________________________________________________________________________YOU MAY BRUSH YOUR TEETH.    2.   A HISTORY/PHYSICAL MUST BE COMPLETED AND DATED WITHIN 30 DAYS OF YOUR SURGERY BY YOUR PCP xxxx__                                                                                                                                                                                                 SURGEON __                                                                                                                                                                                                 HOSPITALIST __    3.  THE FOLLOWING MUST BE DONE WITHIN 30 DAYS OF SURGERY. LAB __  EKG __ CHEST XRAY __ TO BE DONE BY ____  NOT APPICABLE________    4.   IF YOU TAKE A LONG ACTING INSULIN AT NIGHT, CUT YOUR NORMAL DOSE IN HALF, AND TAKE NO DIABETIC MEDCATION IN THE MORNING.    5.   IF YOU TAKE A GLP-1 RECEPTOR (SUCH AS TRULICITY, MOUNJARO, OZEMPIC-WEEKLY), YOU MUST BE OFF x 7 DAYS. IF YOU TAKE A DAILY DOSE SUCH AS RYBELSUS,      YOU MUST STOP 24 HOURS PRIOR TO SURGERY. LAST DOSE__n/a______    6.  IF YOU TAKE A SGLT2 INHIBITOR  (SUCH AS FARXIGA, JARDIANCE, INVOKANA OR SYNJARDY), YOU MUST STOP 3 DAYS PRIOR TO SURGERY. LAST DOSE_n/a______    7.  IF YOU TAKE A BLOOD

## 2025-05-07 ENCOUNTER — TELEPHONE (OUTPATIENT)
Dept: ORTHOPEDIC SURGERY | Age: 60
End: 2025-05-07

## 2025-05-07 NOTE — TELEPHONE ENCOUNTER
General Question     Subject: INFORMATION ON SURGERY TOMORROW   Patient and /or Facility Request: Juan Cantor   Contact Number: [phone]     PATIENT HAS MULTIPLE QUESTIONS REGARDING HIS SURGERY SCHEDULED FOR TOMORROW     PLEASE CALL PATIENT AT THE ABOVE NUMBER

## 2025-05-07 NOTE — PROGRESS NOTES
Returned call to Dr Ritchie.     Left VM message that I have not received any additional information re: patient's cardiac or pulmonary clearances

## 2025-05-07 NOTE — TELEPHONE ENCOUNTER
Has questions if he should take his (Lodapin) sp? blood pressure/ water pill   What time is his surgery and what time he should get there.    Per MF- 2 hours beforehand- 10:30am  lodopin should be fine, they dont want anything with isaias or nancy   Patient had no further questions. Told him to call if he thought of anything else.

## 2025-05-08 ENCOUNTER — ANESTHESIA (OUTPATIENT)
Dept: OPERATING ROOM | Age: 60
End: 2025-05-08
Payer: COMMERCIAL

## 2025-05-08 ENCOUNTER — HOSPITAL ENCOUNTER (OUTPATIENT)
Age: 60
Setting detail: OUTPATIENT SURGERY
Discharge: HOME | End: 2025-05-08
Attending: ORTHOPAEDIC SURGERY | Admitting: ORTHOPAEDIC SURGERY
Payer: COMMERCIAL

## 2025-05-08 ENCOUNTER — ANESTHESIA EVENT (OUTPATIENT)
Dept: OPERATING ROOM | Age: 60
End: 2025-05-08
Payer: COMMERCIAL

## 2025-05-08 VITALS
HEIGHT: 73 IN | TEMPERATURE: 97.8 F | WEIGHT: 208.19 LBS | OXYGEN SATURATION: 95 % | HEART RATE: 64 BPM | DIASTOLIC BLOOD PRESSURE: 102 MMHG | SYSTOLIC BLOOD PRESSURE: 136 MMHG | BODY MASS INDEX: 27.59 KG/M2 | RESPIRATION RATE: 16 BRPM

## 2025-05-08 DIAGNOSIS — S46.012A TRAUMATIC COMPLETE TEAR OF LEFT ROTATOR CUFF, INITIAL ENCOUNTER: Primary | ICD-10-CM

## 2025-05-08 PROCEDURE — 2709999900 HC NON-CHARGEABLE SUPPLY: Performed by: ORTHOPAEDIC SURGERY

## 2025-05-08 PROCEDURE — 7100000000 HC PACU RECOVERY - FIRST 15 MIN: Performed by: ORTHOPAEDIC SURGERY

## 2025-05-08 PROCEDURE — 6360000002 HC RX W HCPCS: Performed by: STUDENT IN AN ORGANIZED HEALTH CARE EDUCATION/TRAINING PROGRAM

## 2025-05-08 PROCEDURE — 64415 NJX AA&/STRD BRCH PLXS IMG: CPT | Performed by: STUDENT IN AN ORGANIZED HEALTH CARE EDUCATION/TRAINING PROGRAM

## 2025-05-08 PROCEDURE — 2580000003 HC RX 258: Performed by: ORTHOPAEDIC SURGERY

## 2025-05-08 PROCEDURE — 3700000001 HC ADD 15 MINUTES (ANESTHESIA): Performed by: ORTHOPAEDIC SURGERY

## 2025-05-08 PROCEDURE — 3600000014 HC SURGERY LEVEL 4 ADDTL 15MIN: Performed by: ORTHOPAEDIC SURGERY

## 2025-05-08 PROCEDURE — 7100000001 HC PACU RECOVERY - ADDTL 15 MIN: Performed by: ORTHOPAEDIC SURGERY

## 2025-05-08 PROCEDURE — L3809 WHFO W/O JOINTS PRE OTS: HCPCS | Performed by: ORTHOPAEDIC SURGERY

## 2025-05-08 PROCEDURE — L3660 SO 8 AB RSTR CAN/WEB PRE OTS: HCPCS | Performed by: ORTHOPAEDIC SURGERY

## 2025-05-08 PROCEDURE — 29826 SHO ARTHRS SRG DECOMPRESSION: CPT | Performed by: ORTHOPAEDIC SURGERY

## 2025-05-08 PROCEDURE — 6370000000 HC RX 637 (ALT 250 FOR IP): Performed by: ORTHOPAEDIC SURGERY

## 2025-05-08 PROCEDURE — 29827 SHO ARTHRS SRG RT8TR CUF RPR: CPT | Performed by: ORTHOPAEDIC SURGERY

## 2025-05-08 PROCEDURE — 7100000011 HC PHASE II RECOVERY - ADDTL 15 MIN: Performed by: ORTHOPAEDIC SURGERY

## 2025-05-08 PROCEDURE — 2500000003 HC RX 250 WO HCPCS: Performed by: NURSE ANESTHETIST, CERTIFIED REGISTERED

## 2025-05-08 PROCEDURE — 3600000004 HC SURGERY LEVEL 4 BASE: Performed by: ORTHOPAEDIC SURGERY

## 2025-05-08 PROCEDURE — 7100000010 HC PHASE II RECOVERY - FIRST 15 MIN: Performed by: ORTHOPAEDIC SURGERY

## 2025-05-08 PROCEDURE — C1713 ANCHOR/SCREW BN/BN,TIS/BN: HCPCS | Performed by: ORTHOPAEDIC SURGERY

## 2025-05-08 PROCEDURE — 6360000002 HC RX W HCPCS: Performed by: NURSE ANESTHETIST, CERTIFIED REGISTERED

## 2025-05-08 PROCEDURE — 2500000003 HC RX 250 WO HCPCS: Performed by: ORTHOPAEDIC SURGERY

## 2025-05-08 PROCEDURE — 2720000010 HC SURG SUPPLY STERILE: Performed by: ORTHOPAEDIC SURGERY

## 2025-05-08 PROCEDURE — 3700000000 HC ANESTHESIA ATTENDED CARE: Performed by: ORTHOPAEDIC SURGERY

## 2025-05-08 PROCEDURE — 6360000002 HC RX W HCPCS: Performed by: ORTHOPAEDIC SURGERY

## 2025-05-08 DEVICE — ANCHOR SUT L24.5MM DIA4.75MM BIOCOMPOSITE SELF PUNCHING: Type: IMPLANTABLE DEVICE | Site: SHOULDER | Status: FUNCTIONAL

## 2025-05-08 RX ORDER — DEXAMETHASONE SODIUM PHOSPHATE 4 MG/ML
INJECTION, SOLUTION INTRA-ARTICULAR; INTRALESIONAL; INTRAMUSCULAR; INTRAVENOUS; SOFT TISSUE
Status: DISCONTINUED | OUTPATIENT
Start: 2025-05-08 | End: 2025-05-08 | Stop reason: SDUPTHER

## 2025-05-08 RX ORDER — METHOCARBAMOL 100 MG/ML
INJECTION, SOLUTION INTRAMUSCULAR; INTRAVENOUS
Status: DISCONTINUED | OUTPATIENT
Start: 2025-05-08 | End: 2025-05-08 | Stop reason: SDUPTHER

## 2025-05-08 RX ORDER — MIDAZOLAM HYDROCHLORIDE 2 MG/2ML
2 INJECTION, SOLUTION INTRAMUSCULAR; INTRAVENOUS ONCE
Status: COMPLETED | OUTPATIENT
Start: 2025-05-08 | End: 2025-05-08

## 2025-05-08 RX ORDER — FENTANYL CITRATE 50 UG/ML
25 INJECTION, SOLUTION INTRAMUSCULAR; INTRAVENOUS EVERY 5 MIN PRN
Status: DISCONTINUED | OUTPATIENT
Start: 2025-05-08 | End: 2025-05-08 | Stop reason: HOSPADM

## 2025-05-08 RX ORDER — HYDROMORPHONE HYDROCHLORIDE 2 MG/ML
0.5 INJECTION, SOLUTION INTRAMUSCULAR; INTRAVENOUS; SUBCUTANEOUS EVERY 5 MIN PRN
Status: DISCONTINUED | OUTPATIENT
Start: 2025-05-08 | End: 2025-05-08 | Stop reason: HOSPADM

## 2025-05-08 RX ORDER — ONDANSETRON 2 MG/ML
INJECTION INTRAMUSCULAR; INTRAVENOUS
Status: DISCONTINUED | OUTPATIENT
Start: 2025-05-08 | End: 2025-05-08 | Stop reason: SDUPTHER

## 2025-05-08 RX ORDER — OXYCODONE HYDROCHLORIDE 5 MG/1
10 TABLET ORAL PRN
Status: DISCONTINUED | OUTPATIENT
Start: 2025-05-08 | End: 2025-05-08 | Stop reason: HOSPADM

## 2025-05-08 RX ORDER — PROMETHAZINE HYDROCHLORIDE 25 MG/1
25 TABLET ORAL EVERY 6 HOURS PRN
Qty: 5 TABLET | Refills: 0 | Status: SHIPPED | OUTPATIENT
Start: 2025-05-08

## 2025-05-08 RX ORDER — LIDOCAINE HYDROCHLORIDE 20 MG/ML
INJECTION, SOLUTION INFILTRATION; PERINEURAL
Status: DISCONTINUED | OUTPATIENT
Start: 2025-05-08 | End: 2025-05-08 | Stop reason: SDUPTHER

## 2025-05-08 RX ORDER — PROPOFOL 10 MG/ML
INJECTION, EMULSION INTRAVENOUS
Status: DISCONTINUED | OUTPATIENT
Start: 2025-05-08 | End: 2025-05-08 | Stop reason: SDUPTHER

## 2025-05-08 RX ORDER — SODIUM CHLORIDE 9 MG/ML
INJECTION, SOLUTION INTRAVENOUS PRN
Status: DISCONTINUED | OUTPATIENT
Start: 2025-05-08 | End: 2025-05-08 | Stop reason: HOSPADM

## 2025-05-08 RX ORDER — POVIDONE-IODINE 10 MG/G
OINTMENT TOPICAL
Status: DISCONTINUED | OUTPATIENT
Start: 2025-05-08 | End: 2025-05-08 | Stop reason: HOSPADM

## 2025-05-08 RX ORDER — SODIUM CHLORIDE 0.9 % (FLUSH) 0.9 %
5-40 SYRINGE (ML) INJECTION EVERY 12 HOURS SCHEDULED
Status: DISCONTINUED | OUTPATIENT
Start: 2025-05-08 | End: 2025-05-08 | Stop reason: HOSPADM

## 2025-05-08 RX ORDER — FENTANYL CITRATE 50 UG/ML
INJECTION, SOLUTION INTRAMUSCULAR; INTRAVENOUS
Status: DISCONTINUED | OUTPATIENT
Start: 2025-05-08 | End: 2025-05-08 | Stop reason: SDUPTHER

## 2025-05-08 RX ORDER — PROCHLORPERAZINE EDISYLATE 5 MG/ML
5 INJECTION INTRAMUSCULAR; INTRAVENOUS
Status: DISCONTINUED | OUTPATIENT
Start: 2025-05-08 | End: 2025-05-08 | Stop reason: HOSPADM

## 2025-05-08 RX ORDER — SODIUM CHLORIDE 0.9 % (FLUSH) 0.9 %
5-40 SYRINGE (ML) INJECTION PRN
Status: DISCONTINUED | OUTPATIENT
Start: 2025-05-08 | End: 2025-05-08 | Stop reason: HOSPADM

## 2025-05-08 RX ORDER — ROPIVACAINE HYDROCHLORIDE 5 MG/ML
INJECTION, SOLUTION EPIDURAL; INFILTRATION; PERINEURAL
Status: COMPLETED | OUTPATIENT
Start: 2025-05-08 | End: 2025-05-08

## 2025-05-08 RX ORDER — ROCURONIUM BROMIDE 10 MG/ML
INJECTION, SOLUTION INTRAVENOUS
Status: DISCONTINUED | OUTPATIENT
Start: 2025-05-08 | End: 2025-05-08 | Stop reason: SDUPTHER

## 2025-05-08 RX ORDER — OXYCODONE HYDROCHLORIDE 5 MG/1
5 TABLET ORAL PRN
Status: DISCONTINUED | OUTPATIENT
Start: 2025-05-08 | End: 2025-05-08 | Stop reason: HOSPADM

## 2025-05-08 RX ORDER — OXYCODONE AND ACETAMINOPHEN 5; 325 MG/1; MG/1
1 TABLET ORAL EVERY 4 HOURS PRN
Qty: 40 TABLET | Refills: 0 | Status: SHIPPED | OUTPATIENT
Start: 2025-05-08 | End: 2025-05-15

## 2025-05-08 RX ORDER — NALOXONE HYDROCHLORIDE 0.4 MG/ML
INJECTION, SOLUTION INTRAMUSCULAR; INTRAVENOUS; SUBCUTANEOUS PRN
Status: DISCONTINUED | OUTPATIENT
Start: 2025-05-08 | End: 2025-05-08 | Stop reason: HOSPADM

## 2025-05-08 RX ORDER — DEXMEDETOMIDINE HYDROCHLORIDE 100 UG/ML
INJECTION, SOLUTION INTRAVENOUS
Status: DISCONTINUED | OUTPATIENT
Start: 2025-05-08 | End: 2025-05-08 | Stop reason: SDUPTHER

## 2025-05-08 RX ADMIN — FENTANYL CITRATE 50 MCG: 50 INJECTION, SOLUTION INTRAMUSCULAR; INTRAVENOUS at 12:34

## 2025-05-08 RX ADMIN — DEXMEDETOMIDINE HYDROCHLORIDE 2 MCG: 100 INJECTION, SOLUTION INTRAVENOUS at 13:36

## 2025-05-08 RX ADMIN — PROPOFOL 200 MG: 10 INJECTION, EMULSION INTRAVENOUS at 12:38

## 2025-05-08 RX ADMIN — ROPIVACAINE HYDROCHLORIDE 25 ML: 5 INJECTION, SOLUTION EPIDURAL; INFILTRATION; PERINEURAL at 12:20

## 2025-05-08 RX ADMIN — DEXMEDETOMIDINE HYDROCHLORIDE 4 MCG: 100 INJECTION, SOLUTION INTRAVENOUS at 13:32

## 2025-05-08 RX ADMIN — ONDANSETRON 4 MG: 2 INJECTION, SOLUTION INTRAMUSCULAR; INTRAVENOUS at 12:56

## 2025-05-08 RX ADMIN — ROCURONIUM BROMIDE 50 MG: 10 INJECTION INTRAVENOUS at 12:38

## 2025-05-08 RX ADMIN — SODIUM CHLORIDE: 9 INJECTION, SOLUTION INTRAVENOUS at 11:47

## 2025-05-08 RX ADMIN — DEXMEDETOMIDINE HYDROCHLORIDE 4 MCG: 100 INJECTION, SOLUTION INTRAVENOUS at 13:18

## 2025-05-08 RX ADMIN — PHENYLEPHRINE HYDROCHLORIDE 100 MCG: 10 INJECTION INTRAVENOUS at 12:48

## 2025-05-08 RX ADMIN — CEFAZOLIN 2000 MG: 2 INJECTION, POWDER, FOR SOLUTION INTRAMUSCULAR; INTRAVENOUS at 12:45

## 2025-05-08 RX ADMIN — PHENYLEPHRINE HYDROCHLORIDE 50 MCG: 10 INJECTION INTRAVENOUS at 13:12

## 2025-05-08 RX ADMIN — LIDOCAINE HYDROCHLORIDE 100 MG: 20 INJECTION, SOLUTION INFILTRATION; PERINEURAL at 12:38

## 2025-05-08 RX ADMIN — ROCURONIUM BROMIDE 10 MG: 10 INJECTION INTRAVENOUS at 13:09

## 2025-05-08 RX ADMIN — SODIUM CHLORIDE: 9 INJECTION, SOLUTION INTRAVENOUS at 13:09

## 2025-05-08 RX ADMIN — MIDAZOLAM 2 MG: 1 INJECTION INTRAMUSCULAR; INTRAVENOUS at 12:23

## 2025-05-08 RX ADMIN — METHOCARBAMOL 500 MG: 100 INJECTION INTRAMUSCULAR; INTRAVENOUS at 12:56

## 2025-05-08 RX ADMIN — SUGAMMADEX 200 MG: 100 INJECTION, SOLUTION INTRAVENOUS at 13:38

## 2025-05-08 RX ADMIN — DEXAMETHASONE SODIUM PHOSPHATE 8 MG: 4 INJECTION, SOLUTION INTRAMUSCULAR; INTRAVENOUS at 12:56

## 2025-05-08 RX ADMIN — FENTANYL CITRATE 50 MCG: 50 INJECTION, SOLUTION INTRAMUSCULAR; INTRAVENOUS at 12:46

## 2025-05-08 ASSESSMENT — PAIN - FUNCTIONAL ASSESSMENT
PAIN_FUNCTIONAL_ASSESSMENT: NONE - DENIES PAIN
PAIN_FUNCTIONAL_ASSESSMENT: 0-10
PAIN_FUNCTIONAL_ASSESSMENT: NONE - DENIES PAIN

## 2025-05-08 ASSESSMENT — PAIN DESCRIPTION - DESCRIPTORS: DESCRIPTORS: THROBBING

## 2025-05-08 NOTE — ANESTHESIA POSTPROCEDURE EVALUATION
Department of Anesthesiology  Postprocedure Note    Patient: Juan Cantor  MRN: [id number]  YOB: 1965  Date of evaluation: 5/8/2025    Procedure Summary       Date: 05/08/25 Room / Location: [address]    Anesthesia Start: 1232 Anesthesia Stop: 1358    Procedure: LEFT SHOULDER ARTHROSCOPY SUBACROMIAL DECOMPRESSION, DISTAL CLAVICLE EXCISION, ROTATOR CUFF REPAIR, BICEPS TENOTOMY (Left: Shoulder) Diagnosis:       Complete rotator cuff tear of left shoulder      Osteoarthritis of left shoulder      (Complete rotator cuff tear of left shoulder [M75.122])      (Osteoarthritis of left shoulder [M19.012])    Surgeons: Donal Goldman MD Responsible Provider: Coleman Becerra DO    Anesthesia Type: general, regional ASA Status: 3            Anesthesia Type: No value filed.    Lilliam Phase I: Lilliam Score: 9    Lilliam Phase II:      Anesthesia Post Evaluation    Patient location during evaluation: PACU  Patient participation: complete - patient participated  Level of consciousness: awake and alert  Airway patency: patent  Nausea & Vomiting: no nausea  Cardiovascular status: hemodynamically stable  Respiratory status: acceptable  Hydration status: stable  Pain management: adequate    No notable events documented.

## 2025-05-08 NOTE — H&P
Preoperative H&P Update    The patient's History and Physical in the medical record from 5/1/25 (scan) was reviewed by me today.  I reviewed the HPI, medications, allergies, reason for surgery, diagnosis and treatment plan and there has been no interval change.    The patient was examined by me today. Physical exam findings for this update include:    /87   Pulse 67   Temp 97.9 °F (36.6 °C) (Temporal)   Resp 17   Ht 1.854 m (6' 1\")   Wt 94.4 kg (208 lb 3 oz)   SpO2 98%   BMI 27.47 kg/m²   Airway is intact  Chest: breathing comfortably  Heart: regular rate  Findings on exam of the body region where surgery is to be performed include: see last office and/or consult note      A prescription monitoring report was reviewed for the patient.         Electronically signed by Donal Goldman MD on 5/8/2025 at 12:08 PM

## 2025-05-08 NOTE — ANESTHESIA PROCEDURE NOTES
Peripheral Block    Patient location during procedure: pre-op  Reason for block: post-op pain management and at surgeon's request  Start time: 5/8/2025 12:20 PM  End time: 5/8/2025 12:26 PM  Staffing  Performed: anesthesiologist   Performed by: Coleman Becerra DO  Authorized by: Coleman Becerra DO    Preanesthetic Checklist  Completed: patient identified, IV checked, site marked, risks and benefits discussed, surgical/procedural consents, equipment checked, pre-op evaluation, timeout performed, anesthesia consent given, oxygen available, monitors applied/VS acknowledged, fire risk safety assessment completed and verbalized and blood product R/B/A discussed and consented  Peripheral Block   Patient position: sitting  Prep: ChloraPrep  Provider prep: mask and sterile gloves  Patient monitoring: cardiac monitor, continuous pulse ox, IV access, oxygen, responsive to questions and frequent blood pressure checks  Block type: Brachial plexus  Interscalene  Laterality: left  Injection technique: single-shot  Guidance: ultrasound guided    Needle   Needle gauge: 22 G  Needle localization: anatomical landmarks and ultrasound guidance  Needle length: 5 cm  Assessment   Injection assessment: negative aspiration for heme, no paresthesia on injection, local visualized surrounding nerve on ultrasound and no intravascular symptoms  Paresthesia pain: none  Slow fractionated injection: yes  Hemodynamics: stable  Outcomes: patient tolerated procedure well and uncomplicated    Medications Administered  ropivacaine (NAROPIN) injection 0.5% - Perineural   25 mL - 5/8/2025 12:20:00 PM

## 2025-05-08 NOTE — DISCHARGE INSTRUCTIONS
DR. GOODMAN'S                  SHOULDER ARTHROSCOPY POSTOPERATIVE INSTRUCTIONS      ACTIVITIES:  Keep arm in sling after surgery. You may move your wrist and hand as much possible. Dr Goodman will tell you when you can stop wearing the sling.         DRESSING: After surgery, a bulky dressing, and sometimes an ice cuff will be applied to your shoulder. It is normal to have a moderate amount of blood-tinted fluid drainage on the dressing. Keep the dressing clean and dry.          ICE/COOLING: Apply ice to your shoulder if you did not purchase an ice cuff. If you did purchase a cooling cuff, follow the instructions included with the cuff to keep it cold.  Wear the cuff continuously for 72 hours postoperatively.  You can ice intermittently (such as 30 minutes on, 30 minutes off).   Make sure the ice or cooling cuff is not directly in contact with your skin.  Prolonged contact can result in frostbite.   After 3 days, use after exercising, or to help with pain and swelling, for 30 minutes, 3 to 5 times a day.      DRIVING:  You may drive once you are out of your sling, have stopped your pain medication, and can use your shoulder normally. This may be a decision to be made between you and your physical therapist. You must be able to control the steering wheel comfortably. You are the one ultimately responsible when behind the wheel.    SHOWERING: You may begin to shower 3 days after your surgery. Keep the incisions covered.  Dr Goodman will tell you if you need to wait longer.    MEDICATION: Although you have a prescription for a strong pain medication, many patients are able to handle the discomfort postoperatively with a milder medication such as Extra-strength Tylenol. You may or may not (based on your surgery - Dr. Goodman will let you know) also have had a prescription for an anti-inflammatory such as Celebrex or Naprosyn, and an anti-nausea medication such as Phenergan.

## 2025-05-08 NOTE — BRIEF OP NOTE
Brief Postoperative Note      Patient: Juan Cantor  YOB: 1965  MRN: [id number]    Date of Procedure: 5/8/2025    Pre-Op Diagnosis Codes:      * Complete rotator cuff tear of left shoulder [M75.122]    Post-Op Diagnosis: Same + type 2 SLAP tear       Procedure(s):  LEFT SHOULDER ARTHROSCOPY SUBACROMIAL DECOMPRESSION, ROTATOR CUFF REPAIR, BICEPS TENOTOMY    Surgeon(s):  Donal Goldman MD    Assistant:  Surgical Assistant: Kirsten Polanco    Anesthesia: General + block    Estimated Blood Loss (mL): Minimal    Complications: None    Specimens:   * No specimens in log *    Implants:  Implant Name Type Inv. Item Serial No.  Lot No. LRB No. Used Action   ANCHOR SUT L24.5MM DIA4.75MM BIOCOMPOSITE SELF PUNCHING - [id number]  ANCHOR SUT L24.5MM DIA4.75MM BIOCOMPOSITE SELF PUNCHING  [id number] Left 2 Implanted         Drains: * No LDAs found *    Findings:  Infection Present At Time Of Surgery (PATOS) (choose all levels that have infection present):  No infection present        Electronically signed by Donal Goldman MD on 5/8/2025 at 1:42 PM

## 2025-05-08 NOTE — ANESTHESIA PRE PROCEDURE
Department of Anesthesiology  Preprocedure Note       Name:  Juan Cantor   Age:  59 y.o.  :  1965                                          MRN:  [id number]         Date:  2025      Surgeon: Surgeon(s):  Donal Goldman MD    Procedure: Procedure(s):  LEFT SHOULDER ARTHROSCOPY SUBACROMIAL DECOMPRESSION, DISTAL CLAVICLE EXCISION, ROTATOR CUFF REPAIR    Medications prior to admission:   Prior to Admission medications    Medication Sig Start Date End Date Taking? Authorizing Provider   oxyCODONE-acetaminophen (PERCOCET) 5-325 MG per tablet Take 1 tablet by mouth every 4 hours as needed for Pain for up to 7 days. Max Daily Amount: 6 tablets 5/8/25 5/15/25 Yes Donal Goldman MD   promethazine (PHENERGAN) 25 MG tablet Take 1 tablet by mouth every 6 hours as needed for Nausea 25  Yes Donal Goldman MD   meloxicam (MOBIC) 7.5 MG tablet Take 1 tablet by mouth daily 4/15/25 5/15/25 Yes Kwabena Gonzalez MD   rosuvastatin (CRESTOR) 40 MG tablet TAKE 1 TABLET BY MOUTH EVERY NIGHT 1/15/25  Yes Hector Grier MD   albuterol sulfate HFA (VENTOLIN HFA) 108 (90 Base) MCG/ACT inhaler Inhale 2 puffs into the lungs 4 times daily as needed for Wheezing 12/15/24  Yes Donal Leos MD   aspirin (MARIA ESTHER ASPIRIN) 325 MG tablet Take 1 tablet by mouth daily 24  Yes Adriana Chand, VIPUL - NP   amLODIPine (NORVASC) 10 MG tablet Take 1 tablet by mouth daily   Yes Shane Ferrell MD   losartan-hydroCHLOROthiazide (HYZAAR) 100-12.5 MG per tablet Take 1 tablet by mouth daily   Yes Shane Ferrell MD   albuterol (PROVENTIL) (5 MG/ML) 0.5% nebulizer solution Take 1 mL by nebulization 4 times daily as needed for Wheezing 24  Yes Marvel Pruett MD   albuterol sulfate HFA (PROAIR HFA) 108 (90 Base) MCG/ACT inhaler Inhale 2 puffs into the lungs every 6 hours as needed for Wheezing 24  Yes Marvel Pruett MD   clopidogrel (PLAVIX) 75 MG tablet Take 1 tablet by mouth daily 24

## 2025-05-08 NOTE — OP NOTE
Juan Cantor (1965)    Date of Surgery- 5/8/2025      Preoperative Diagnosis:  Left shoulder rotator cuff tear                                           Postoperative Diagnosis:  Left shoulder rotator cuff tear, type 2 SLAP tear     Procedure:  Left shoulder Diagnostic arthroscopy, subacromial decompression, biceps tenotomy, rotator cuff repair      Surgeon: Donal Goldman MD    Surgical Assistant: Kirsten Polanco    Anesthesia: General and interscalene nerve block    Estimated blood loss:  minimal    Complications: none    Disposition:  To PACU in good condition      Indications for Procedure  Devaughn is a 59 y.o. male who was seen in the office for left shoulder pain.  He was previously seen by my partners and treated nonoperatively without improved.  MRI demonstrated full-thickness tear of the supraspinatus and a partial-thickness articular sided tear of the subscapularis.  We discussed operative and nonoperative treatment options and recommended surgical intervention.  We discussed the risks, benefits, complications, and alternatives of the  procedure. The patient chose to proceed with the procedure. The patient subsequently provided written informed consent for the procedure.  At no time were any guarantees implied or stated.    Site Marking and Surgical Prep  The patient was seen in the preoperative holding area and the appropriate extremity marked.  Interscalene block was administered by the anesthesia department. The patient was taken to the operating room, identified, and transferred onto the operating room table in the supine position.  The patient was then induced under general anesthesia.  Patient received prophylactic preoperative IV antibiotics and had DVT prophylaxis with sequential compression devices on the bilateral lower extremities    Diagnostic Arthroscopy  The operative extremity was then sterilely prepped and draped in the standard fashion.  The arm was placed in an arthroscopic arm

## 2025-05-09 ENCOUNTER — OFFICE VISIT (OUTPATIENT)
Dept: ORTHOPEDIC SURGERY | Age: 60
End: 2025-05-09

## 2025-05-09 VITALS — WEIGHT: 208 LBS | HEIGHT: 73 IN | BODY MASS INDEX: 27.57 KG/M2

## 2025-05-09 DIAGNOSIS — M75.122 COMPLETE TEAR OF LEFT ROTATOR CUFF, UNSPECIFIED WHETHER TRAUMATIC: Primary | ICD-10-CM

## 2025-05-09 PROCEDURE — 99024 POSTOP FOLLOW-UP VISIT: CPT | Performed by: STUDENT IN AN ORGANIZED HEALTH CARE EDUCATION/TRAINING PROGRAM

## 2025-05-09 RX ORDER — GABAPENTIN 300 MG/1
300 CAPSULE ORAL 3 TIMES DAILY
Qty: 90 CAPSULE | Refills: 0 | Status: SHIPPED | OUTPATIENT
Start: 2025-05-09 | End: 2025-06-08

## 2025-05-09 NOTE — PROGRESS NOTES
Shoulder Arthroscopy Follow-up  Juan Cantor is here for follow up after left shoulder arthroscopic surgery. Surgery date was 5/8/25. Findings at surgery: Rotator cuff tear. Pain is not well controlled with Percocet.  The patient's pain is rated at 10/10. The patient denies fever, wound drainage, increasing redness, pus, increasing swelling. Post op problems reported: none. He has been in a sling and non-weightbearing as instructed.       Physical Examination:  Ht 1.854 m (6' 1\")   Wt 94.3 kg (208 lb)   BMI 27.44 kg/m²     Patient is awake, alert, and in no acute distress.  The incisions are clean, dry, no drainage. There is no warmth, erythema, or purulent drainage over the incisions.    Sensation is intact to light touch in the axillary, median, radial, and ulnar nerve distribution bilaterally. The EPL, FPL, and interossei are grossly intact bilaterally. The Bilateral upper extremities are warm and well-perfused with brisk capillary refill.        Assessment:   1 day status post Left shoulder Diagnostic arthroscopy, subacromial decompression, biceps tenotomy, rotator cuff repair       Plan:   We reviewed intra-operative arthroscopy photos.    Start physical therapy at 2 weeks post op. A physical therapy order will be placed and postoperative physical therapy protocol was provided to the patient to give to the physical therapist at the next appt.     The patient may not advance their weight-bearing.    Sling for 6 weeks total after surgery. Elbow ROM when out of sling.    The patient should use the cold pad or apply ice to the shoulder continuously for 3 days after surgery. Then use cold pad or ice 20-30 minutes only 3-5 times per day as needed.    Refill pain medications as needed.    I will add a trial of gabapentin 300 mg TID and tizanidine QHS to the current regimen as pain is not controlled by percocet. Counseled on risks, benefits and alternatives and recommended not to take the medicine and drive or

## 2025-05-15 ENCOUNTER — TELEPHONE (OUTPATIENT)
Dept: ORTHOPEDIC SURGERY | Age: 60
End: 2025-05-15

## 2025-05-15 DIAGNOSIS — M75.122 COMPLETE TEAR OF LEFT ROTATOR CUFF, UNSPECIFIED WHETHER TRAUMATIC: Primary | ICD-10-CM

## 2025-05-15 RX ORDER — HYDROCODONE BITARTRATE AND ACETAMINOPHEN 5; 325 MG/1; MG/1
1 TABLET ORAL EVERY 6 HOURS PRN
Qty: 28 TABLET | Refills: 0 | Status: SHIPPED | OUTPATIENT
Start: 2025-05-15 | End: 2025-05-22

## 2025-05-15 NOTE — TELEPHONE ENCOUNTER
Prescription Refill      Medication Name: hydrocodone  [facility]  Patient Contact Number: [phone]    Please call patient to make aware once meds are sent

## 2025-05-15 NOTE — TELEPHONE ENCOUNTER
Spoke with patient. Advised of refill and reminded him not to drink or use marijuana. Patient stated that he is not smoking and is following all orders.

## 2025-05-15 NOTE — TELEPHONE ENCOUNTER
Prescription for Gans E scribed to pharmacy per patient's request, instead of Percocet.    As a reminder to the patient he should not be drinking while taking narcotics or using the gabapentin.  An FYI for the patient is that marijuana use can be causing patient's increased need for pain medication after surgery.

## 2025-05-18 DIAGNOSIS — M75.102 TEAR OF LEFT ROTATOR CUFF, UNSPECIFIED TEAR EXTENT, UNSPECIFIED WHETHER TRAUMATIC: ICD-10-CM

## 2025-05-19 RX ORDER — MELOXICAM 7.5 MG/1
7.5 TABLET ORAL DAILY
Qty: 30 TABLET | Refills: 0 | OUTPATIENT
Start: 2025-05-19 | End: 2025-06-18

## 2025-05-19 NOTE — TELEPHONE ENCOUNTER
Patient last seen 4/15/25 and medication last filled 4/15/25:    Requested Prescriptions     Pending Prescriptions Disp Refills    meloxicam (MOBIC) 7.5 MG tablet [Pharmacy Med Name: MELOXICAM 7.5MG TABLETS] 30 tablet 0     Sig: TAKE 1 TABLET BY MOUTH DAILY         Refused.

## 2025-05-23 ENCOUNTER — TELEPHONE (OUTPATIENT)
Dept: ORTHOPEDIC SURGERY | Age: 60
End: 2025-05-23

## 2025-05-23 ENCOUNTER — OFFICE VISIT (OUTPATIENT)
Dept: ORTHOPEDIC SURGERY | Age: 60
End: 2025-05-23

## 2025-05-23 VITALS — WEIGHT: 208 LBS | BODY MASS INDEX: 27.57 KG/M2 | HEIGHT: 73 IN

## 2025-05-23 DIAGNOSIS — M75.122 COMPLETE TEAR OF LEFT ROTATOR CUFF, UNSPECIFIED WHETHER TRAUMATIC: ICD-10-CM

## 2025-05-23 DIAGNOSIS — M75.102 TEAR OF LEFT ROTATOR CUFF, UNSPECIFIED TEAR EXTENT, UNSPECIFIED WHETHER TRAUMATIC: Primary | ICD-10-CM

## 2025-05-23 DIAGNOSIS — S46.012A TRAUMATIC COMPLETE TEAR OF LEFT ROTATOR CUFF, INITIAL ENCOUNTER: ICD-10-CM

## 2025-05-23 PROCEDURE — 99024 POSTOP FOLLOW-UP VISIT: CPT | Performed by: STUDENT IN AN ORGANIZED HEALTH CARE EDUCATION/TRAINING PROGRAM

## 2025-05-23 RX ORDER — CYCLOBENZAPRINE HCL 10 MG
10 TABLET ORAL NIGHTLY PRN
Qty: 30 TABLET | Refills: 0 | Status: SHIPPED | OUTPATIENT
Start: 2025-05-23 | End: 2025-06-22

## 2025-05-23 RX ORDER — HYDROCODONE BITARTRATE AND ACETAMINOPHEN 5; 325 MG/1; MG/1
1 TABLET ORAL EVERY 6 HOURS PRN
Qty: 28 TABLET | Refills: 0 | Status: SHIPPED | OUTPATIENT
Start: 2025-05-23 | End: 2025-05-30

## 2025-05-23 NOTE — PROGRESS NOTES
Shoulder Arthroscopy Follow-up  Juan Cantor is here for follow up after left shoulder arthroscopic surgery. Surgery date was 5/8/25. Findings at surgery: Rotator cuff tear. Pain is not well controlled with Percocet and gabapentin.  The patient's pain is rated at 10/10. The patient denies fever, wound drainage, increasing redness, pus, increasing swelling. Post op problems reported: none. He has been in a sling and non-weightbearing as instructed.       Physical Examination:  Ht 1.854 m (6' 1\")   Wt 94.3 kg (208 lb)   BMI 27.44 kg/m²     Patient is awake, alert, and in no acute distress.  The incisions are healing.   Sensation is intact to light touch in the axillary, median, radial, and ulnar nerve distribution bilaterally. The EPL, FPL, and interossei are grossly intact bilaterally. The Bilateral upper extremities are warm and well-perfused with brisk capillary refill.        Assessment:   2 weeks status post Left shoulder Diagnostic arthroscopy, subacromial decompression, biceps tenotomy, rotator cuff repair       Plan:   Sutures were removed.  Steri-strips and mastisol were applied.    Patient may start taking baths or soaks at 4 weeks postop    Start physical therapy. A physical therapy order was placed and postoperative physical therapy protocol was provided to the patient to give to the physical therapist.     The patient may not advance their weight-bearing as tolerated.    Sling for 6 weeks total after surgery.    Refill pain medications as needed. I will add a trial of flexeril to the current regimen. Counseled on risks, benefits and alternatives and recommended not to take the medicine and drive or operate heavy machinery. Stop the tizanidine.       Judicious use of NSAID's.     Return to office at the 6 week postop time period for evaluation of progress or prn if problems.                CINDY Crowder, PA-C  Board Certified by the National Committee on Certification of Physician

## 2025-05-23 NOTE — TELEPHONE ENCOUNTER
Prescription Refill     Medication Name:  Oxycodone   Pharmacy: [facility] in [place]   Patient Contact Number:  [phone]

## 2025-05-30 ENCOUNTER — TELEPHONE (OUTPATIENT)
Dept: ORTHOPEDIC SURGERY | Age: 60
End: 2025-05-30

## 2025-05-30 DIAGNOSIS — M75.122 COMPLETE TEAR OF LEFT ROTATOR CUFF, UNSPECIFIED WHETHER TRAUMATIC: ICD-10-CM

## 2025-05-30 RX ORDER — HYDROCODONE BITARTRATE AND ACETAMINOPHEN 5; 325 MG/1; MG/1
1 TABLET ORAL EVERY 6 HOURS PRN
Qty: 28 TABLET | Refills: 0 | Status: SHIPPED | OUTPATIENT
Start: 2025-05-30 | End: 2025-06-06

## 2025-05-30 NOTE — TELEPHONE ENCOUNTER
Prescription Refill      Medication Name: hydrocodone  [facility]  Patient Contact Number: [phone]    Please call patient to make aware once meds are sent

## 2025-06-02 ENCOUNTER — HOSPITAL ENCOUNTER (OUTPATIENT)
Dept: PHYSICAL THERAPY | Age: 60
Setting detail: THERAPIES SERIES
Discharge: HOME | End: 2025-06-02
Payer: COMMERCIAL

## 2025-06-02 DIAGNOSIS — M25.612 DECREASED RANGE OF MOTION OF LEFT SHOULDER: Primary | ICD-10-CM

## 2025-06-02 DIAGNOSIS — R29.898 LEFT ARM WEAKNESS: ICD-10-CM

## 2025-06-02 PROCEDURE — 97161 PT EVAL LOW COMPLEX 20 MIN: CPT

## 2025-06-02 PROCEDURE — 97110 THERAPEUTIC EXERCISES: CPT

## 2025-06-02 NOTE — PLAN OF CARE
[facility] - Outpatient Rehabilitation and Therapy: [address], [place] [address] office: [phone] fax: [phone]     Physical Therapy Initial Evaluation Certification      Dear MICHAEL Crowder ,    We had the pleasure of evaluating the following patient for physical therapy services at [facility].  A summary of our findings can be found in the initial assessment below.  This includes our plan of care.  If you have any questions or concerns regarding these findings, please do not hesitate to contact me at the office phone number listed above.  Thank you for the referral.     Physician Signature:_______________________________Date:__________________  By signing above (or electronic signature), therapist’s plan is approved by physician       Physical Therapy: TREATMENT/PROGRESS NOTE   Patient: Juan Cantor (59 y.o. male)   Examination Date: 2025   :  1965 MRN: [id number]   Visit #: 1   Insurance Allowable Auth Needed   30 pcy []Yes    []No    Insurance: Payor: [employer] PLAN / Plan: [employer] PLAN / Product Type: *No Product type* /   Insurance ID: [id number] - (Medicaid Managed)  Secondary Insurance (if applicable):    Treatment Diagnosis:     ICD-10-CM    1. Decreased range of motion of left shoulder  M25.612       2. Left arm weakness  R29.898          Medical Diagnosis:  Tear of left rotator cuff, unspecified tear extent, unspecified whether traumatic [M75.102]   Referring Physician: Lexus Heller PA  PCP: Romeo Ritchie, VIPUL - NP     Plan of care signed (Y/N):     Date of Patient follow up with Physician:      Plan of Care Report: EVAL today  POC update due: (10 visits /OR AUTH LIMITS, whichever is less)  2025                                             Medical History:  Comorbidities:  See EPIC  Relevant Medical History:                                          Precautions/

## 2025-06-05 ENCOUNTER — HOSPITAL ENCOUNTER (OUTPATIENT)
Dept: PHYSICAL THERAPY | Age: 60
Setting detail: THERAPIES SERIES
Discharge: HOME | End: 2025-06-05
Payer: COMMERCIAL

## 2025-06-05 PROCEDURE — 97110 THERAPEUTIC EXERCISES: CPT

## 2025-06-05 PROCEDURE — 97140 MANUAL THERAPY 1/> REGIONS: CPT

## 2025-06-05 NOTE — FLOWSHEET NOTE
[facility] - Outpatient Rehabilitation and Therapy: [address], [place] [address] office: [phone] fax: [phone]     Physical Therapy Initial Evaluation Certification      Physical Therapy: TREATMENT/PROGRESS NOTE   Patient: Juan Cantor (59 y.o. male)   Examination Date: 2025   :  1965 MRN: [id number]   Visit #: 2   Insurance Allowable Auth Needed   30 pcy [x]Yes    []No    After 8 visits    Insurance: Payor: [employer] / Plan: [employer] PLAN / Product Type: *No Product type* /   Insurance ID: [id number] - (Medicaid Managed)  Secondary Insurance (if applicable):    Treatment Diagnosis:     ICD-10-CM    1. Decreased range of motion of left shoulder  M25.612       2. Left arm weakness  R29.898          Medical Diagnosis:  Tear of left rotator cuff, unspecified tear extent, unspecified whether traumatic [M75.102]   Referring Physician: Lexus Heller PA  PCP: Romeo Ritchie APRN - NP     Plan of care signed (Y/N):     Date of Patient follow up with Physician:      Plan of Care Report: NO  POC update due: (10 visits /OR AUTH LIMITS, whichever is less)  2025                                             Medical History:  Comorbidities:  See EPIC  Relevant Medical History:                                          Precautions/ Contra-indications:           Latex allergy:  NO  Pacemaker:    NO  Contraindications for Manipulation: None  Date of Surgery: 2025 - Left shoulder Diagnostic arthroscopy, subacromial decompression, biceps tenotomy, rotator cuff repair   Other:     1.  Superior Labrum/Biceps Tendon-  Intact biceps tendon.  There was a type II SLAP tear  2.  Anterior/Posterior Labrum-anterior labral  fraying  3.  Rotator Cuff- The subscapularis tendon demonstrated high-grade partial articular surface tearing of the superior edge of the tendon at the footprint.  The supraspinatus tendon full-thickness minimally retracted

## 2025-06-06 ENCOUNTER — TELEPHONE (OUTPATIENT)
Dept: ORTHOPEDIC SURGERY | Age: 60
End: 2025-06-06

## 2025-06-09 ENCOUNTER — TELEPHONE (OUTPATIENT)
Dept: ORTHOPEDIC SURGERY | Age: 60
End: 2025-06-09

## 2025-06-09 ENCOUNTER — APPOINTMENT (OUTPATIENT)
Dept: PHYSICAL THERAPY | Age: 60
End: 2025-06-09
Payer: COMMERCIAL

## 2025-06-09 DIAGNOSIS — M75.122 COMPLETE TEAR OF LEFT ROTATOR CUFF, UNSPECIFIED WHETHER TRAUMATIC: Primary | ICD-10-CM

## 2025-06-09 DIAGNOSIS — M75.102 TEAR OF LEFT ROTATOR CUFF, UNSPECIFIED TEAR EXTENT, UNSPECIFIED WHETHER TRAUMATIC: ICD-10-CM

## 2025-06-09 RX ORDER — HYDROCODONE BITARTRATE AND ACETAMINOPHEN 5; 325 MG/1; MG/1
1 TABLET ORAL EVERY 8 HOURS PRN
Qty: 21 TABLET | Refills: 0 | Status: SHIPPED | OUTPATIENT
Start: 2025-06-09 | End: 2025-06-16

## 2025-06-10 ENCOUNTER — HOSPITAL ENCOUNTER (OUTPATIENT)
Dept: PHYSICAL THERAPY | Age: 60
Setting detail: THERAPIES SERIES
End: 2025-06-10
Payer: COMMERCIAL

## 2025-06-10 RX ORDER — CYCLOBENZAPRINE HCL 10 MG
TABLET ORAL
Qty: 30 TABLET | Refills: 0 | Status: SHIPPED | OUTPATIENT
Start: 2025-06-10

## 2025-06-12 ENCOUNTER — HOSPITAL ENCOUNTER (OUTPATIENT)
Dept: PHYSICAL THERAPY | Age: 60
Setting detail: THERAPIES SERIES
Discharge: HOME | End: 2025-06-12
Payer: COMMERCIAL

## 2025-06-12 PROCEDURE — 97110 THERAPEUTIC EXERCISES: CPT

## 2025-06-12 PROCEDURE — 97112 NEUROMUSCULAR REEDUCATION: CPT

## 2025-06-12 NOTE — FLOWSHEET NOTE
Electrical Stimulation, and Vasoneumatic Compression  Patient education on joint protection, postural re-education, activity modification, and progression of HEP    Plan: POC initiated as per evaluation    Electronically Signed by Vega Chambers PT, DPT  Date: 06/12/2025   Note: Portions of this note have been templated and/or copied from initial evaluation, reassessments and prior notes for documentation efficiency.  Note: If patient does not return for scheduled/recommended follow up visits, this note will serve as a discharge from care along with the most recent update on progress.

## 2025-06-16 ENCOUNTER — APPOINTMENT (OUTPATIENT)
Dept: PHYSICAL THERAPY | Age: 60
End: 2025-06-16
Payer: COMMERCIAL
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
## 2025-06-19 ENCOUNTER — HOSPITAL ENCOUNTER (OUTPATIENT)
Dept: PHYSICAL THERAPY | Age: 60
Setting detail: THERAPIES SERIES
End: 2025-06-19
Payer: COMMERCIAL

## 2025-06-20 ENCOUNTER — OFFICE VISIT (OUTPATIENT)
Dept: ORTHOPEDIC SURGERY | Age: 60
End: 2025-06-20

## 2025-06-20 VITALS — BODY MASS INDEX: 27.57 KG/M2 | WEIGHT: 208 LBS | HEIGHT: 73 IN

## 2025-06-20 DIAGNOSIS — M75.122 COMPLETE TEAR OF LEFT ROTATOR CUFF, UNSPECIFIED WHETHER TRAUMATIC: ICD-10-CM

## 2025-06-20 PROCEDURE — 99024 POSTOP FOLLOW-UP VISIT: CPT | Performed by: STUDENT IN AN ORGANIZED HEALTH CARE EDUCATION/TRAINING PROGRAM

## 2025-06-20 RX ORDER — BACLOFEN 10 MG/1
10 TABLET ORAL 2 TIMES DAILY PRN
Qty: 28 TABLET | Refills: 0 | Status: SHIPPED | OUTPATIENT
Start: 2025-06-20 | End: 2025-07-04

## 2025-06-20 RX ORDER — HYDROCODONE BITARTRATE AND ACETAMINOPHEN 5; 325 MG/1; MG/1
1 TABLET ORAL 2 TIMES DAILY
Qty: 14 TABLET | Refills: 0 | Status: SHIPPED | OUTPATIENT
Start: 2025-06-20 | End: 2025-06-27

## 2025-06-20 NOTE — PROGRESS NOTES
Shoulder Arthroscopy Follow-up  Juan Cantor is here for follow up after left shoulder arthroscopic surgery. Surgery date was 5/8/25. Findings at surgery: Rotator cuff tear. Pain is not well controlled with Percocet and gabapentin.  The patient's pain is rated at 4/10. The patient denies fever, wound drainage, increasing redness, pus, increasing swelling. Post op problems reported: none. He has been in a sling and non-weightbearing as instructed.       Physical Examination:  Ht 1.854 m (6' 1\")   Wt 94.3 kg (208 lb)   BMI 27.44 kg/m²     Patient is awake, alert, and in no acute distress.  The incisions are healed.   Sensation is intact to light touch in the axillary, median, radial, and ulnar nerve distribution bilaterally. The EPL, FPL, and interossei are grossly intact bilaterally. The Bilateral upper extremities are warm and well-perfused with brisk capillary refill.        Assessment:   6 weeks status post Left shoulder Diagnostic arthroscopy, subacromial decompression, biceps tenotomy, rotator cuff repair       Plan:   Continue physical therapy.     Patient may advance weight bearing under guidance of PT.     May discontinue sling at this time.     Refill pain medications as needed. I will add baclofen to his regimen for day time use. He should only be using flexeril at night due to side effect of drowsiness. He voiced understanding.     Judicious use of NSAID's.     Return to office in 2 months with Dr. Goldman.                 Lexus Heller, CINDY, PA-C  Board Certified by the National Committee on Certification of Physician Assistants  [facility]       Disclaimer:  This note was generated with use of a verbal recognition program (DRAGON) and an attempt was made to check for errors.  It is possible that there are still dictated errors within this office note.  If so, please bring any significant errors to my attention for an addendum.  All efforts were made to ensure

## 2025-06-27 ENCOUNTER — HOSPITAL ENCOUNTER (EMERGENCY)
Age: 60
Discharge: HOME | End: 2025-06-27
Attending: EMERGENCY MEDICINE
Payer: COMMERCIAL

## 2025-06-27 VITALS
OXYGEN SATURATION: 99 % | SYSTOLIC BLOOD PRESSURE: 142 MMHG | TEMPERATURE: 98 F | RESPIRATION RATE: 16 BRPM | DIASTOLIC BLOOD PRESSURE: 82 MMHG | HEART RATE: 83 BPM

## 2025-06-27 DIAGNOSIS — H10.9 CONJUNCTIVITIS OF RIGHT EYE, UNSPECIFIED CONJUNCTIVITIS TYPE: Primary | ICD-10-CM

## 2025-06-27 PROCEDURE — 99283 EMERGENCY DEPT VISIT LOW MDM: CPT

## 2025-06-27 PROCEDURE — 6370000000 HC RX 637 (ALT 250 FOR IP): Performed by: EMERGENCY MEDICINE

## 2025-06-27 RX ORDER — TETRACAINE HYDROCHLORIDE 5 MG/ML
1 SOLUTION OPHTHALMIC ONCE
Status: COMPLETED | OUTPATIENT
Start: 2025-06-27 | End: 2025-06-27

## 2025-06-27 RX ORDER — TETRACAINE HYDROCHLORIDE 5 MG/ML
2 SOLUTION OPHTHALMIC ONCE
Status: DISCONTINUED | OUTPATIENT
Start: 2025-06-27 | End: 2025-06-27

## 2025-06-27 RX ORDER — ERYTHROMYCIN 5 MG/G
OINTMENT OPHTHALMIC ONCE
Status: COMPLETED | OUTPATIENT
Start: 2025-06-27 | End: 2025-06-27

## 2025-06-27 RX ADMIN — TETRACAINE HYDROCHLORIDE 1 DROP: 5 SOLUTION OPHTHALMIC at 11:17

## 2025-06-27 RX ADMIN — ERYTHROMYCIN: 5 OINTMENT OPHTHALMIC at 11:20

## 2025-06-27 ASSESSMENT — PAIN DESCRIPTION - LOCATION: LOCATION: EYE

## 2025-06-27 ASSESSMENT — PAIN - FUNCTIONAL ASSESSMENT
PAIN_FUNCTIONAL_ASSESSMENT: 0-10
PAIN_FUNCTIONAL_ASSESSMENT: NONE - DENIES PAIN

## 2025-06-27 ASSESSMENT — PAIN DESCRIPTION - PAIN TYPE: TYPE: ACUTE PAIN

## 2025-06-27 ASSESSMENT — PAIN DESCRIPTION - ORIENTATION: ORIENTATION: RIGHT

## 2025-06-27 ASSESSMENT — PAIN DESCRIPTION - DESCRIPTORS: DESCRIPTORS: SHARP

## 2025-06-27 ASSESSMENT — PAIN SCALES - GENERAL
PAINLEVEL_OUTOF10: 9
PAINLEVEL_OUTOF10: 0
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
## 2025-06-30 ENCOUNTER — TELEPHONE (OUTPATIENT)
Dept: ORTHOPEDIC SURGERY | Age: 60
End: 2025-06-30

## 2025-06-30 DIAGNOSIS — M75.122 COMPLETE TEAR OF LEFT ROTATOR CUFF, UNSPECIFIED WHETHER TRAUMATIC: Primary | ICD-10-CM

## 2025-06-30 RX ORDER — HYDROCODONE BITARTRATE AND ACETAMINOPHEN 5; 325 MG/1; MG/1
1 TABLET ORAL 2 TIMES DAILY PRN
Qty: 14 TABLET | Refills: 0 | Status: SHIPPED | OUTPATIENT
Start: 2025-06-30 | End: 2025-07-07

## 2025-06-30 NOTE — TELEPHONE ENCOUNTER
A prescription monitoring report was reviewed for the patient.     Prescription for Mouth[employer] refill escribed.

## 2025-06-30 NOTE — TELEPHONE ENCOUNTER
Prescription Refill      Medication Name: oxycodone  [facility]  Patient Contact Number: [phone]    Please call patient to make aware once meds are sent

## 2025-07-08 ENCOUNTER — TELEPHONE (OUTPATIENT)
Dept: ORTHOPEDIC SURGERY | Age: 60
End: 2025-07-08

## 2025-07-08 DIAGNOSIS — M75.122 COMPLETE TEAR OF LEFT ROTATOR CUFF, UNSPECIFIED WHETHER TRAUMATIC: Primary | ICD-10-CM

## 2025-07-08 RX ORDER — HYDROCODONE BITARTRATE AND ACETAMINOPHEN 5; 325 MG/1; MG/1
1 TABLET ORAL DAILY PRN
Qty: 7 TABLET | Refills: 0 | Status: SHIPPED | OUTPATIENT
Start: 2025-07-08 | End: 2025-07-15

## 2025-07-08 NOTE — TELEPHONE ENCOUNTER
Prescription Refill      Medication Name: PAIN MEDS  Pharmacy: [facility]  Patient Contact Number: [phone]    Please call patient to make aware once meds are sent

## 2025-07-16 ENCOUNTER — HOSPITAL ENCOUNTER (EMERGENCY)
Age: 60
Discharge: HOME | End: 2025-07-16
Attending: EMERGENCY MEDICINE
Payer: COMMERCIAL

## 2025-07-16 ENCOUNTER — APPOINTMENT (OUTPATIENT)
Dept: CT IMAGING | Age: 60
End: 2025-07-16
Payer: COMMERCIAL

## 2025-07-16 VITALS
HEART RATE: 78 BPM | DIASTOLIC BLOOD PRESSURE: 88 MMHG | HEIGHT: 73 IN | RESPIRATION RATE: 18 BRPM | WEIGHT: 214.73 LBS | TEMPERATURE: 97.8 F | SYSTOLIC BLOOD PRESSURE: 135 MMHG | OXYGEN SATURATION: 100 % | BODY MASS INDEX: 28.46 KG/M2

## 2025-07-16 DIAGNOSIS — N30.01 ACUTE CYSTITIS WITH HEMATURIA: ICD-10-CM

## 2025-07-16 LAB
BACTERIA URNS QL MICRO: ABNORMAL /HPF
BILIRUB UR QL STRIP.AUTO: NEGATIVE
CLARITY UR: CLEAR
COLOR UR: YELLOW
EPI CELLS #/AREA URNS HPF: ABNORMAL /HPF (ref 0–5)
GLUCOSE UR STRIP.AUTO-MCNC: NEGATIVE MG/DL
HGB UR QL STRIP.AUTO: ABNORMAL
KETONES UR STRIP.AUTO-MCNC: NEGATIVE MG/DL
LEUKOCYTE ESTERASE UR QL STRIP.AUTO: NEGATIVE
NITRITE UR QL STRIP.AUTO: NEGATIVE
PH UR STRIP.AUTO: 7 [PH] (ref 5–8)
PROT UR STRIP.AUTO-MCNC: NEGATIVE MG/DL
RBC #/AREA URNS HPF: ABNORMAL /HPF (ref 0–4)
SP GR UR STRIP.AUTO: <=1.005 (ref 1–1.03)
UA COMPLETE W REFLEX CULTURE PNL UR: ABNORMAL
UA DIPSTICK W REFLEX MICRO PNL UR: YES
URN SPEC COLLECT METH UR: ABNORMAL
UROBILINOGEN UR STRIP-ACNC: 0.2 E.U./DL
WBC #/AREA URNS HPF: ABNORMAL /HPF (ref 0–5)

## 2025-07-16 PROCEDURE — 99284 EMERGENCY DEPT VISIT MOD MDM: CPT

## 2025-07-16 PROCEDURE — 74176 CT ABD & PELVIS W/O CONTRAST: CPT

## 2025-07-16 PROCEDURE — 81001 URINALYSIS AUTO W/SCOPE: CPT

## 2025-07-16 RX ORDER — TAMSULOSIN HYDROCHLORIDE 0.4 MG/1
0.4 CAPSULE ORAL DAILY
Qty: 30 CAPSULE | Refills: 0 | Status: SHIPPED | OUTPATIENT
Start: 2025-07-16

## 2025-07-16 RX ORDER — HYDROCODONE BITARTRATE AND ACETAMINOPHEN 5; 325 MG/1; MG/1
1 TABLET ORAL EVERY 4 HOURS PRN
Qty: 18 TABLET | Refills: 0 | Status: SHIPPED | OUTPATIENT
Start: 2025-07-16 | End: 2025-07-19

## 2025-07-16 RX ORDER — CEFUROXIME AXETIL 250 MG/1
250 TABLET ORAL 2 TIMES DAILY
Qty: 14 TABLET | Refills: 0 | Status: SHIPPED | OUTPATIENT
Start: 2025-07-16 | End: 2025-07-23

## 2025-07-16 ASSESSMENT — PAIN SCALES - GENERAL
PAINLEVEL_OUTOF10: 7
PAINLEVEL_OUTOF10: 7

## 2025-07-16 ASSESSMENT — PAIN DESCRIPTION - FREQUENCY
FREQUENCY: INTERMITTENT
FREQUENCY: CONTINUOUS

## 2025-07-16 ASSESSMENT — PAIN DESCRIPTION - LOCATION
LOCATION: ABDOMEN
LOCATION: ABDOMEN

## 2025-07-16 ASSESSMENT — PAIN DESCRIPTION - ORIENTATION
ORIENTATION: LOWER
ORIENTATION: LOWER

## 2025-07-16 ASSESSMENT — PAIN DESCRIPTION - PAIN TYPE
TYPE: ACUTE PAIN
TYPE: ACUTE PAIN

## 2025-07-16 ASSESSMENT — PAIN - FUNCTIONAL ASSESSMENT
PAIN_FUNCTIONAL_ASSESSMENT: 0-10
PAIN_FUNCTIONAL_ASSESSMENT: 0-10

## 2025-07-16 ASSESSMENT — PAIN DESCRIPTION - DESCRIPTORS
DESCRIPTORS: SHARP
DESCRIPTORS: SHARP
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
## 2025-07-16 NOTE — DISCHARGE INSTRUCTIONS
Please call urology for outpatient cystoscopy for evaluation of potential underlying bladder cancer bladder malignancy, please tell them you have a history of lung cancer smoking and have abnormal ct scan

## 2025-07-16 NOTE — ED NOTES
Patient given prescription,discharge instructions verbal and written, patient verbalized understanding.  Alert/oriented X4, Clear speech.  Patient exhibits no distress, ambulates with steady gait per self leaving unit, no further request.

## 2025-07-16 NOTE — ED TRIAGE NOTES
Patient to ed with complaints of lower abd pain which started 2-3 days ago patient denies nausea, vomiting or diarrhea.

## 2025-07-22 ENCOUNTER — HOSPITAL ENCOUNTER (EMERGENCY)
Age: 60
Discharge: HOME | End: 2025-07-22
Attending: EMERGENCY MEDICINE
Payer: COMMERCIAL

## 2025-07-22 ENCOUNTER — APPOINTMENT (OUTPATIENT)
Dept: GENERAL RADIOLOGY | Age: 60
End: 2025-07-22
Payer: COMMERCIAL

## 2025-07-22 VITALS
HEART RATE: 77 BPM | WEIGHT: 214.73 LBS | RESPIRATION RATE: 18 BRPM | DIASTOLIC BLOOD PRESSURE: 66 MMHG | BODY MASS INDEX: 28.33 KG/M2 | OXYGEN SATURATION: 100 % | SYSTOLIC BLOOD PRESSURE: 127 MMHG | TEMPERATURE: 97.6 F

## 2025-07-22 DIAGNOSIS — R07.1 CHEST PAIN ON BREATHING: Primary | ICD-10-CM

## 2025-07-22 LAB
ALBUMIN SERPL-MCNC: 4.4 G/DL (ref 3.4–5)
ALBUMIN/GLOB SERPL: 1.8 {RATIO} (ref 1.1–2.2)
ALP SERPL-CCNC: 65 U/L (ref 40–129)
ALT SERPL-CCNC: 18 U/L (ref 10–40)
ANION GAP SERPL CALCULATED.3IONS-SCNC: 10 MMOL/L (ref 3–16)
AST SERPL-CCNC: 27 U/L (ref 15–37)
BASOPHILS # BLD: 0.1 K/UL (ref 0–0.2)
BASOPHILS NFR BLD: 1.8 %
BILIRUB SERPL-MCNC: <0.2 MG/DL (ref 0–1)
BUN SERPL-MCNC: 31 MG/DL (ref 7–20)
CALCIUM SERPL-MCNC: 9.6 MG/DL (ref 8.3–10.6)
CHLORIDE SERPL-SCNC: 104 MMOL/L (ref 99–110)
CO2 SERPL-SCNC: 25 MMOL/L (ref 21–32)
CREAT SERPL-MCNC: 1.8 MG/DL (ref 0.9–1.3)
D-DIMER QUANTITATIVE: <0.27 UG/ML FEU (ref 0–0.6)
DEPRECATED RDW RBC AUTO: 14.3 % (ref 12.4–15.4)
EKG ATRIAL RATE: 72 BPM
EKG DIAGNOSIS: NORMAL
EKG P AXIS: 55 DEGREES
EKG P-R INTERVAL: 164 MS
EKG Q-T INTERVAL: 388 MS
EKG QRS DURATION: 94 MS
EKG QTC CALCULATION (BAZETT): 424 MS
EKG R AXIS: 48 DEGREES
EKG T AXIS: -7 DEGREES
EKG VENTRICULAR RATE: 72 BPM
EOSINOPHIL # BLD: 0.2 K/UL (ref 0–0.6)
EOSINOPHIL NFR BLD: 2.4 %
GFR SERPLBLD CREATININE-BSD FMLA CKD-EPI: 43 ML/MIN/{1.73_M2}
GLUCOSE SERPL-MCNC: 123 MG/DL (ref 70–99)
HCT VFR BLD AUTO: 42.9 % (ref 40.5–52.5)
HGB BLD-MCNC: 14.3 G/DL (ref 13.5–17.5)
LYMPHOCYTES # BLD: 2.1 K/UL (ref 1–5.1)
LYMPHOCYTES NFR BLD: 26.6 %
MAGNESIUM SERPL-MCNC: 2.37 MG/DL (ref 1.8–2.4)
MCH RBC QN AUTO: 27.9 PG (ref 26–34)
MCHC RBC AUTO-ENTMCNC: 33.3 G/DL (ref 31–36)
MCV RBC AUTO: 83.9 FL (ref 80–100)
MONOCYTES # BLD: 0.7 K/UL (ref 0–1.3)
MONOCYTES NFR BLD: 8.3 %
NEUTROPHILS # BLD: 4.9 K/UL (ref 1.7–7.7)
NEUTROPHILS NFR BLD: 60.9 %
PLATELET # BLD AUTO: 188 K/UL (ref 135–450)
PMV BLD AUTO: 9.9 FL (ref 5–10.5)
POTASSIUM SERPL-SCNC: 4.3 MMOL/L (ref 3.5–5.1)
PROT SERPL-MCNC: 6.8 G/DL (ref 6.4–8.2)
RBC # BLD AUTO: 5.11 M/UL (ref 4.2–5.9)
SODIUM SERPL-SCNC: 139 MMOL/L (ref 136–145)
TROPONIN, HIGH SENSITIVITY: 13 NG/L (ref 0–22)
TROPONIN, HIGH SENSITIVITY: 14 NG/L (ref 0–22)
WBC # BLD AUTO: 8 K/UL (ref 4–11)

## 2025-07-22 PROCEDURE — 85025 COMPLETE CBC W/AUTO DIFF WBC: CPT

## 2025-07-22 PROCEDURE — 6370000000 HC RX 637 (ALT 250 FOR IP): Performed by: EMERGENCY MEDICINE

## 2025-07-22 PROCEDURE — 80053 COMPREHEN METABOLIC PANEL: CPT

## 2025-07-22 PROCEDURE — 71046 X-RAY EXAM CHEST 2 VIEWS: CPT

## 2025-07-22 PROCEDURE — 99285 EMERGENCY DEPT VISIT HI MDM: CPT

## 2025-07-22 PROCEDURE — 93005 ELECTROCARDIOGRAM TRACING: CPT | Performed by: EMERGENCY MEDICINE

## 2025-07-22 PROCEDURE — 85379 FIBRIN DEGRADATION QUANT: CPT

## 2025-07-22 PROCEDURE — 83735 ASSAY OF MAGNESIUM: CPT

## 2025-07-22 PROCEDURE — 93010 ELECTROCARDIOGRAM REPORT: CPT | Performed by: INTERNAL MEDICINE

## 2025-07-22 PROCEDURE — 84484 ASSAY OF TROPONIN QUANT: CPT

## 2025-07-22 RX ORDER — METHOCARBAMOL 750 MG/1
750 TABLET, FILM COATED ORAL 3 TIMES DAILY PRN
Qty: 15 TABLET | Refills: 0 | Status: SHIPPED | OUTPATIENT
Start: 2025-07-22 | End: 2025-07-27

## 2025-07-22 RX ORDER — METHOCARBAMOL 500 MG/1
1000 TABLET, FILM COATED ORAL ONCE
Status: COMPLETED | OUTPATIENT
Start: 2025-07-22 | End: 2025-07-22

## 2025-07-22 RX ORDER — PREDNISONE 20 MG/1
40 TABLET ORAL DAILY
Qty: 10 TABLET | Refills: 0 | Status: SHIPPED | OUTPATIENT
Start: 2025-07-22 | End: 2025-07-27

## 2025-07-22 RX ADMIN — METHOCARBAMOL 1000 MG: 500 TABLET ORAL at 02:57

## 2025-07-22 ASSESSMENT — PAIN SCALES - GENERAL: PAINLEVEL_OUTOF10: 8

## 2025-07-22 ASSESSMENT — PAIN - FUNCTIONAL ASSESSMENT: PAIN_FUNCTIONAL_ASSESSMENT: 0-10

## 2025-07-22 ASSESSMENT — PAIN DESCRIPTION - LOCATION: LOCATION: CHEST

## 2025-07-22 ASSESSMENT — HEART SCORE: ECG: NON-SPECIFC REPOLARIZATION DISTURBANCE/LBTB/PM

## 2025-07-22 ASSESSMENT — PAIN DESCRIPTION - DESCRIPTORS: DESCRIPTORS: SHARP

## 2025-07-22 NOTE — DISCHARGE INSTRUCTIONS
Return for fever, increased chest pain or pressure, increased trouble breathing, dizziness or fainting, chest pain occurring with exertion and improving with rest.
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
## 2025-07-31 ENCOUNTER — TELEPHONE (OUTPATIENT)
Dept: ORTHOPEDIC SURGERY | Age: 60
End: 2025-07-31

## 2025-07-31 DIAGNOSIS — M75.122 COMPLETE TEAR OF LEFT ROTATOR CUFF, UNSPECIFIED WHETHER TRAUMATIC: Primary | ICD-10-CM

## 2025-07-31 DIAGNOSIS — N30.01 ACUTE CYSTITIS WITH HEMATURIA: ICD-10-CM

## 2025-07-31 RX ORDER — HYDROCODONE BITARTRATE AND ACETAMINOPHEN 5; 325 MG/1; MG/1
1 TABLET ORAL DAILY
Qty: 7 TABLET | Refills: 0 | Status: SHIPPED | OUTPATIENT
Start: 2025-07-31 | End: 2025-08-07

## 2025-07-31 NOTE — TELEPHONE ENCOUNTER
Prescription Refill     Medication Name: HYDROCODONE   Pharmacy: [id number] [address] - P [phone] - F [phone]   Patient Contact Number:  [phone]

## 2025-08-11 ENCOUNTER — TELEPHONE (OUTPATIENT)
Dept: ORTHOPEDIC SURGERY | Age: 60
End: 2025-08-11

## 2025-08-21 ENCOUNTER — OFFICE VISIT (OUTPATIENT)
Dept: ORTHOPEDIC SURGERY | Age: 60
End: 2025-08-21

## 2025-08-21 VITALS — HEIGHT: 73 IN | BODY MASS INDEX: 27.96 KG/M2 | WEIGHT: 211 LBS | RESPIRATION RATE: 16 BRPM

## 2025-08-21 DIAGNOSIS — M75.102 TEAR OF LEFT ROTATOR CUFF, UNSPECIFIED TEAR EXTENT, UNSPECIFIED WHETHER TRAUMATIC: Primary | ICD-10-CM

## 2025-08-21 DIAGNOSIS — M19.012 ARTHRITIS OF LEFT ACROMIOCLAVICULAR JOINT: ICD-10-CM

## 2025-08-21 DIAGNOSIS — S46.012A TRAUMATIC COMPLETE TEAR OF LEFT ROTATOR CUFF, INITIAL ENCOUNTER: ICD-10-CM

## 2025-08-21 DIAGNOSIS — M75.122 COMPLETE TEAR OF LEFT ROTATOR CUFF, UNSPECIFIED WHETHER TRAUMATIC: ICD-10-CM

## 2025-09-04 ENCOUNTER — TELEPHONE (OUTPATIENT)
Dept: ORTHOPEDIC SURGERY | Age: 60
End: 2025-09-04

## (undated) DEVICE — SUTURE SUTTAPE FIBERLINK 1.3MM WHT BLU CLS LOOP AR7535

## (undated) DEVICE — NEEDLE SUT FOR EXPRESSEW LL AND LLL SUT PASS EXPRESSEW LLL

## (undated) DEVICE — SUTURE TIGERTAPE TIGERWIRE SZ 2-0 L30IN NONABSORBABLE AR72377T

## (undated) DEVICE — WEREWOLF FLOW 90 COBLATION WAND: Brand: COBLATION

## (undated) DEVICE — DYONICS 25 PATIENT TUBE SET MUST                                    BE USED WITH 7211007, 12 PER BOX

## (undated) DEVICE — SUTURE PROL SZ 0 L30IN NONABSORBABLE BLU MO-6 L26MM 1/2 CIR 8418H

## (undated) DEVICE — SHOULDER CANNULA SET WITHOUT FENESTRATIONS, 5.5 MM (I.D.) X 70 MM: Brand: CONMED

## (undated) DEVICE — STERILE POLYISOPRENE POWDER-FREE SURGICAL GLOVES WITH EMOLLIENT COATING: Brand: PROTEXIS

## (undated) DEVICE — TOWEL,OR,DSP,ST,BLUE,STD,4/PK,20PK/CS: Brand: MEDLINE

## (undated) DEVICE — TUBING, SUCTION, 1/4" X 10', STRAIGHT: Brand: MEDLINE

## (undated) DEVICE — BLANKET WRM W40.2XL55.9IN IORT LO BODY + MISTRAL AIR

## (undated) DEVICE — INCISOR PLUS PLATINUM 4.5 MM BLADE: Brand: DYONICS INCISOR

## (undated) DEVICE — PASSER SUT 25DEG L CRV TIGHT NIT WIRE LOOP 2 FIBERSTICK

## (undated) DEVICE — GAUZE,SPONGE,2"X2",8PLY,STERILE,LF,2'S: Brand: MEDLINE

## (undated) DEVICE — SUTURE PROL SZ 0 L30IN NONABSORBABLE BLU L36MM CT-1 1/2 CIR 8424H

## (undated) DEVICE — DYONICS 4.0 MM ELITE                                    ACROMIOBLASTER STRAIGHT DISPOSABLE                                    BURRS, SAGE GREEN, 10000 MAXIMUM                                    RPM, PACKAGED 6 PER BOX, STERILE

## (undated) DEVICE — Device

## (undated) DEVICE — CANNULA ARTHSCP L7CM ID8.25MM TRNSLUC THRD FLX W/ NO SQUIRT

## (undated) DEVICE — 3M™ STERI-DRAPE™ INCISE DRAPE 1050 (60CM X 45CM): Brand: STERI-DRAPE™

## (undated) DEVICE — GLOVE ORTHO 7 1/2   MSG9475

## (undated) DEVICE — SUTURE PROL SZ 3-0 L18IN NONABSORBABLE BLU L19MM FS-2 3/8 8665G

## (undated) DEVICE — SHOULDER-LF: Brand: MEDLINE INDUSTRIES, INC.

## (undated) DEVICE — 3M™ STERI-STRIP™ REINFORCED ADHESIVE SKIN CLOSURES, R1547, 1/2 IN X 4 IN (12 MM X 100 MM), 6 STRIPS/ENVELOPE: Brand: 3M™ STERI-STRIP™

## (undated) DEVICE — SOL IRR SOD CHL 0.9% TITAN XL CNTNR 3000ML

## (undated) DEVICE — SLING ARM L ABV 13IN DE-ROTATION STRP HOOKS PROVIDE IMMOB

## (undated) DEVICE — APPLICATOR MEDICATED 26 CC SOLUTION HI LT ORNG CHLORAPREP

## (undated) DEVICE — GLOVE ORANGE PI 7 1/2   MSG9075

## (undated) DEVICE — MAT FLR W28XL40IN STD GRN 60% RECYCL MAT LO ABSRB SGL LAYR

## (undated) DEVICE — 3M™ TEGADERM™ TRANSPARENT FILM DRESSING FRAME STYLE, 1626W, 4 IN X 4-3/4 IN (10 CM X 12 CM), 50/CT 4CT/CASE: Brand: 3M™ TEGADERM™